# Patient Record
Sex: FEMALE | Race: WHITE | NOT HISPANIC OR LATINO | Employment: OTHER | URBAN - METROPOLITAN AREA
[De-identification: names, ages, dates, MRNs, and addresses within clinical notes are randomized per-mention and may not be internally consistent; named-entity substitution may affect disease eponyms.]

---

## 2023-03-08 LAB
ALANINE AMINOTRANSFERASE (SGPT) (U/L) IN SER/PLAS: 21 U/L (ref 7–45)
ALBUMIN (G/DL) IN SER/PLAS: 3.8 G/DL (ref 3.4–5)
ALKALINE PHOSPHATASE (U/L) IN SER/PLAS: 66 U/L (ref 33–136)
ANION GAP IN SER/PLAS: 14 MMOL/L (ref 10–20)
ASPARTATE AMINOTRANSFERASE (SGOT) (U/L) IN SER/PLAS: 27 U/L (ref 9–39)
BASOPHILS (10*3/UL) IN BLOOD BY AUTOMATED COUNT: 0.06 X10E9/L (ref 0–0.1)
BASOPHILS/100 LEUKOCYTES IN BLOOD BY AUTOMATED COUNT: 0.4 % (ref 0–2)
BILIRUBIN TOTAL (MG/DL) IN SER/PLAS: 0.3 MG/DL (ref 0–1.2)
CALCIUM (MG/DL) IN SER/PLAS: 8.9 MG/DL (ref 8.6–10.6)
CARBON DIOXIDE, TOTAL (MMOL/L) IN SER/PLAS: 27 MMOL/L (ref 21–32)
CHLORIDE (MMOL/L) IN SER/PLAS: 98 MMOL/L (ref 98–107)
CREATININE (MG/DL) IN SER/PLAS: 0.42 MG/DL (ref 0.5–1.05)
EOSINOPHILS (10*3/UL) IN BLOOD BY AUTOMATED COUNT: 0.18 X10E9/L (ref 0–0.7)
EOSINOPHILS/100 LEUKOCYTES IN BLOOD BY AUTOMATED COUNT: 1.3 % (ref 0–6)
ERYTHROCYTE DISTRIBUTION WIDTH (RATIO) BY AUTOMATED COUNT: 18.3 % (ref 11.5–14.5)
ERYTHROCYTE MEAN CORPUSCULAR HEMOGLOBIN CONCENTRATION (G/DL) BY AUTOMATED: 30.7 G/DL (ref 32–36)
ERYTHROCYTE MEAN CORPUSCULAR VOLUME (FL) BY AUTOMATED COUNT: 93 FL (ref 80–100)
ERYTHROCYTES (10*6/UL) IN BLOOD BY AUTOMATED COUNT: 4.46 X10E12/L (ref 4–5.2)
GFR FEMALE: >90 ML/MIN/1.73M2
GLUCOSE (MG/DL) IN SER/PLAS: 228 MG/DL (ref 74–99)
HEMATOCRIT (%) IN BLOOD BY AUTOMATED COUNT: 41.4 % (ref 36–46)
HEMOGLOBIN (G/DL) IN BLOOD: 12.7 G/DL (ref 12–16)
IMMATURE GRANULOCYTES/100 LEUKOCYTES IN BLOOD BY AUTOMATED COUNT: 0.4 % (ref 0–0.9)
LEUKOCYTES (10*3/UL) IN BLOOD BY AUTOMATED COUNT: 14.1 X10E9/L (ref 4.4–11.3)
LYMPHOCYTES (10*3/UL) IN BLOOD BY AUTOMATED COUNT: 5.71 X10E9/L (ref 1.2–4.8)
LYMPHOCYTES/100 LEUKOCYTES IN BLOOD BY AUTOMATED COUNT: 40.5 % (ref 13–44)
MONOCYTES (10*3/UL) IN BLOOD BY AUTOMATED COUNT: 1.47 X10E9/L (ref 0.1–1)
MONOCYTES/100 LEUKOCYTES IN BLOOD BY AUTOMATED COUNT: 10.4 % (ref 2–10)
NEUTROPHILS (10*3/UL) IN BLOOD BY AUTOMATED COUNT: 6.62 X10E9/L (ref 1.2–7.7)
NEUTROPHILS/100 LEUKOCYTES IN BLOOD BY AUTOMATED COUNT: 47 % (ref 40–80)
PLATELETS (10*3/UL) IN BLOOD AUTOMATED COUNT: 384 X10E9/L (ref 150–450)
POLYCHROMASIA IN BLOOD BY LIGHT MICROSCOPY: NORMAL
POTASSIUM (MMOL/L) IN SER/PLAS: 4.1 MMOL/L (ref 3.5–5.3)
PROTEIN TOTAL: 6.8 G/DL (ref 6.4–8.2)
RBC MORPHOLOGY IN BLOOD: NORMAL
SODIUM (MMOL/L) IN SER/PLAS: 135 MMOL/L (ref 136–145)
TARGET CELLS IN BLOOD BY LIGHT MICROSCOPY: NORMAL
UREA NITROGEN (MG/DL) IN SER/PLAS: 7 MG/DL (ref 6–23)

## 2023-07-14 ENCOUNTER — APPOINTMENT (OUTPATIENT)
Dept: LAB | Facility: LAB | Age: 63
End: 2023-07-14
Payer: COMMERCIAL

## 2023-07-14 LAB
APPEARANCE, URINE: CLEAR
BACTERIA, URINE: ABNORMAL /HPF
BILIRUBIN, URINE: NEGATIVE
BLOOD, URINE: NEGATIVE
BUDDING YEAST, URINE: PRESENT /HPF
COLOR, URINE: ABNORMAL
GLUCOSE, URINE: 1000 MG/DL
KETONES, URINE: NEGATIVE MG/DL
LEUKOCYTE ESTERASE, URINE: ABNORMAL
NITRITE, URINE: NEGATIVE
PH, URINE: 7.5 (ref 5–8)
PROTEIN, URINE: NEGATIVE MG/DL
RBC, URINE: ABNORMAL /HPF (ref 0–5)
SPECIFIC GRAVITY, URINE: 1 (ref 1–1.03)
SQUAMOUS EPITHELIAL CELLS, URINE: ABNORMAL /HPF
UROBILINOGEN, URINE: <2 MG/DL (ref 0.2–1)
WBC, URINE: ABNORMAL /HPF (ref 0–5)

## 2023-07-16 LAB — URINE CULTURE: NORMAL

## 2023-09-25 PROBLEM — Z79.84 LONG TERM (CURRENT) USE OF ORAL HYPOGLYCEMIC DRUGS: Status: ACTIVE | Noted: 2021-11-08

## 2023-09-25 PROBLEM — G62.9 PERIPHERAL NEUROPATHY: Status: ACTIVE | Noted: 2023-09-25

## 2023-09-25 PROBLEM — R50.9 FEVER: Status: ACTIVE | Noted: 2023-09-25

## 2023-09-25 PROBLEM — A41.9 SEPSIS (MULTI): Status: ACTIVE | Noted: 2023-09-25

## 2023-09-25 PROBLEM — E11.9 TYPE 2 DIABETES MELLITUS WITHOUT COMPLICATION (MULTI): Status: ACTIVE | Noted: 2023-05-26

## 2023-09-25 PROBLEM — E86.0 DEHYDRATION: Status: ACTIVE | Noted: 2023-05-26

## 2023-09-25 PROBLEM — Z15.09 GENETIC PREDISPOSITION TO CANCER: Status: ACTIVE | Noted: 2023-09-25

## 2023-09-25 PROBLEM — A41.9 SEPTIC SHOCK (MULTI): Status: ACTIVE | Noted: 2021-12-01

## 2023-09-25 PROBLEM — K12.30 MUCOSITIS: Status: ACTIVE | Noted: 2023-09-25

## 2023-09-25 PROBLEM — C78.7 PANCREATIC CANCER METASTASIZED TO LIVER (MULTI): Status: ACTIVE | Noted: 2023-09-25

## 2023-09-25 PROBLEM — J98.11 ATELECTASIS OF BOTH LUNGS: Status: ACTIVE | Noted: 2023-09-25

## 2023-09-25 PROBLEM — G47.00 INSOMNIA: Status: ACTIVE | Noted: 2023-09-25

## 2023-09-25 PROBLEM — Z87.891 PERSONAL HISTORY OF NICOTINE DEPENDENCE: Status: ACTIVE | Noted: 2021-11-08

## 2023-09-25 PROBLEM — D64.9 ANEMIA: Status: ACTIVE | Noted: 2023-09-25

## 2023-09-25 PROBLEM — I10 MALIGNANT ESSENTIAL HYPERTENSION: Status: ACTIVE | Noted: 2023-09-25

## 2023-09-25 PROBLEM — K56.609 SMALL BOWEL OBSTRUCTION (MULTI): Status: ACTIVE | Noted: 2023-05-26

## 2023-09-25 PROBLEM — K86.89 PANCREATIC FISTULA (HHS-HCC): Status: ACTIVE | Noted: 2023-09-25

## 2023-09-25 PROBLEM — C44.92 SCC (SQUAMOUS CELL CARCINOMA): Status: ACTIVE | Noted: 2023-09-25

## 2023-09-25 PROBLEM — Z90.411 HISTORY OF PARTIAL PANCREATECTOMY: Status: ACTIVE | Noted: 2023-09-25

## 2023-09-25 PROBLEM — R65.21 SEPTIC SHOCK (MULTI): Status: ACTIVE | Noted: 2021-12-01

## 2023-09-25 PROBLEM — Z90.81 POST-SPLENECTOMY: Status: ACTIVE | Noted: 2023-09-25

## 2023-09-25 PROBLEM — Z15.01 BRCA POSITIVE: Status: ACTIVE | Noted: 2023-09-25

## 2023-09-25 PROBLEM — E83.42 HYPOMAGNESEMIA: Status: ACTIVE | Noted: 2023-09-25

## 2023-09-25 PROBLEM — C25.9 PANCREATIC CANCER METASTASIZED TO LIVER (MULTI): Status: ACTIVE | Noted: 2023-09-25

## 2023-09-25 PROBLEM — Z99.81 REQUIRES OXYGEN THERAPY: Status: ACTIVE | Noted: 2023-09-25

## 2023-09-25 PROBLEM — R09.02 HYPOXEMIA: Status: ACTIVE | Noted: 2018-08-31

## 2023-09-25 PROBLEM — G47.34 NOCTURNAL HYPOXEMIA: Status: ACTIVE | Noted: 2023-09-25

## 2023-09-25 PROBLEM — J44.9 COPD (CHRONIC OBSTRUCTIVE PULMONARY DISEASE) (MULTI): Status: ACTIVE | Noted: 2023-09-25

## 2023-09-25 PROBLEM — R13.10 DYSPHAGIA: Status: ACTIVE | Noted: 2023-09-25

## 2023-09-25 PROBLEM — Z79.4 LONG TERM (CURRENT) USE OF INSULIN (MULTI): Status: ACTIVE | Noted: 2021-11-08

## 2023-09-25 PROBLEM — E87.8 HYPOCHLOREMIA: Status: ACTIVE | Noted: 2023-05-26

## 2023-09-25 PROBLEM — E87.6 HYPOKALEMIA: Status: ACTIVE | Noted: 2021-11-24

## 2023-09-25 PROBLEM — T82.9XXA COMPLICATION OF CENTRAL VENOUS CATHETER: Status: ACTIVE | Noted: 2023-09-25

## 2023-09-25 PROBLEM — E11.9 DIABETES MELLITUS WITHOUT COMPLICATION (MULTI): Status: ACTIVE | Noted: 2021-11-08

## 2023-09-25 PROBLEM — Z79.899 OTHER LONG TERM (CURRENT) DRUG THERAPY: Status: ACTIVE | Noted: 2021-11-08

## 2023-09-25 PROBLEM — E78.00 HYPERCHOLESTEROLEMIA: Status: ACTIVE | Noted: 2023-09-25

## 2023-09-25 PROBLEM — R09.02 HYPOXIA: Status: ACTIVE | Noted: 2023-09-25

## 2023-09-25 PROBLEM — I10 BENIGN ESSENTIAL HTN: Status: ACTIVE | Noted: 2023-05-26

## 2023-09-25 PROBLEM — C25.9: Status: ACTIVE | Noted: 2023-05-26

## 2023-09-25 PROBLEM — D22.39 MELANOCYTIC NEVI OF OTHER PARTS OF FACE: Status: ACTIVE | Noted: 2019-07-09

## 2023-09-25 PROBLEM — G89.3 CANCER-RELATED PAIN: Status: ACTIVE | Noted: 2023-09-25

## 2023-09-25 PROBLEM — C25.9 MALIGNANT NEOPLASM OF PANCREAS (MULTI): Status: ACTIVE | Noted: 2023-09-25

## 2023-09-25 PROBLEM — I10 HYPERTENSION: Status: ACTIVE | Noted: 2023-09-25

## 2023-09-25 PROBLEM — E11.42 DIABETIC POLYNEUROPATHY ASSOCIATED WITH TYPE 2 DIABETES MELLITUS (MULTI): Status: ACTIVE | Noted: 2023-06-12

## 2023-09-25 PROBLEM — G25.3 MYOCLONIC JERKING: Status: ACTIVE | Noted: 2021-12-01

## 2023-09-25 PROBLEM — E87.70 FLUID OVERLOAD: Status: ACTIVE | Noted: 2023-09-25

## 2023-09-25 PROBLEM — R31.0 GROSS HEMATURIA: Status: ACTIVE | Noted: 2023-05-28

## 2023-09-25 PROBLEM — L82.1 OTHER SEBORRHEIC KERATOSIS: Status: ACTIVE | Noted: 2019-07-09

## 2023-09-25 PROBLEM — R69 FAMILIAL DISEASE: Status: ACTIVE | Noted: 2023-09-25

## 2023-09-25 PROBLEM — E43 SEVERE PROTEIN-CALORIE MALNUTRITION (GOMEZ: LESS THAN 60% OF STANDARD WEIGHT) (MULTI): Status: ACTIVE | Noted: 2023-09-25

## 2023-09-25 PROBLEM — C25.2 MALIGNANT NEOPLASM OF TAIL OF PANCREAS (MULTI): Status: ACTIVE | Noted: 2021-11-08

## 2023-09-25 PROBLEM — R73.9 HYPERGLYCEMIA: Status: ACTIVE | Noted: 2023-09-25

## 2023-09-25 PROBLEM — I81 PORTAL VEIN THROMBOSIS: Status: ACTIVE | Noted: 2023-09-25

## 2023-09-25 PROBLEM — R97.8 ELEVATED CA 19-9 LEVEL: Status: ACTIVE | Noted: 2023-09-25

## 2023-09-25 PROBLEM — Z15.09 BRCA POSITIVE: Status: ACTIVE | Noted: 2023-09-25

## 2023-09-25 PROBLEM — K43.2 VENTRAL INCISIONAL HERNIA: Status: ACTIVE | Noted: 2023-05-26

## 2023-09-25 PROBLEM — E87.1 HYPONATREMIA: Status: ACTIVE | Noted: 2023-05-26

## 2023-09-25 PROBLEM — J96.11 CHRONIC RESPIRATORY FAILURE WITH HYPOXIA, ON HOME OXYGEN THERAPY (MULTI): Status: ACTIVE | Noted: 2023-05-26

## 2023-09-25 PROBLEM — F41.9 ANXIETY: Status: ACTIVE | Noted: 2023-09-25

## 2023-09-25 PROBLEM — R93.89 ABNORMAL CHEST CT: Status: ACTIVE | Noted: 2018-08-31

## 2023-09-25 PROBLEM — E66.9 OBESITY, CLASS I, BMI 30-34.9: Status: ACTIVE | Noted: 2023-05-27

## 2023-09-25 PROBLEM — Z99.81 CHRONIC RESPIRATORY FAILURE WITH HYPOXIA, ON HOME OXYGEN THERAPY (MULTI): Status: ACTIVE | Noted: 2023-05-26

## 2023-09-25 PROBLEM — R19.00 PELVIC MASS: Status: ACTIVE | Noted: 2023-09-25

## 2023-09-25 PROBLEM — R74.01 TRANSAMINITIS: Status: ACTIVE | Noted: 2023-09-25

## 2023-09-25 PROBLEM — J96.01 ACUTE RESPIRATORY FAILURE WITH HYPOXEMIA (MULTI): Status: ACTIVE | Noted: 2021-11-25

## 2023-09-25 PROBLEM — E44.1 MILD PROTEIN-CALORIE MALNUTRITION (MULTI): Status: ACTIVE | Noted: 2023-05-26

## 2023-09-25 PROBLEM — I26.99 PULMONARY EMBOLISM (MULTI): Status: ACTIVE | Noted: 2023-04-26

## 2023-09-25 PROBLEM — G47.33 OBSTRUCTIVE SLEEP APNEA, ADULT: Status: ACTIVE | Noted: 2023-09-25

## 2023-09-25 PROBLEM — G93.40 ACUTE ENCEPHALOPATHY: Status: ACTIVE | Noted: 2021-12-16

## 2023-09-25 PROBLEM — N17.9 ACUTE KIDNEY FAILURE, UNSPECIFIED (CMS-HCC): Status: ACTIVE | Noted: 2021-11-25

## 2023-09-25 PROBLEM — D18.01 HEMANGIOMA OF SKIN AND SUBCUTANEOUS TISSUE: Status: ACTIVE | Noted: 2019-07-09

## 2023-09-25 PROBLEM — F17.200 NICOTINE DEPENDENCE: Status: ACTIVE | Noted: 2023-05-27

## 2023-09-25 PROBLEM — K86.89 PANCREATIC MASS (HHS-HCC): Status: ACTIVE | Noted: 2023-09-25

## 2023-09-25 PROBLEM — D72.829 LEUKOCYTOSIS: Status: ACTIVE | Noted: 2023-05-26

## 2023-09-25 PROBLEM — R06.09 DYSPNEA ON EXERTION: Status: ACTIVE | Noted: 2018-08-31

## 2023-09-25 PROBLEM — D22.5 MELANOCYTIC NEVI OF TRUNK: Status: ACTIVE | Noted: 2019-07-09

## 2023-09-25 PROBLEM — C25.9: Status: ACTIVE | Noted: 2023-09-25

## 2023-09-25 PROBLEM — E66.9 OBESITY: Status: ACTIVE | Noted: 2023-09-25

## 2023-09-25 PROBLEM — Z90.411: Status: ACTIVE | Noted: 2021-11-08

## 2023-09-25 PROBLEM — I82.890 SPLENIC VEIN THROMBOSIS: Status: ACTIVE | Noted: 2023-09-25

## 2023-09-25 PROBLEM — C77.2: Status: ACTIVE | Noted: 2023-05-26

## 2023-09-25 PROBLEM — L81.4 OTHER MELANIN HYPERPIGMENTATION: Status: ACTIVE | Noted: 2019-07-09

## 2023-09-25 PROBLEM — N17.9 ACUTE KIDNEY INJURY (CMS-HCC): Status: ACTIVE | Noted: 2023-09-25

## 2023-09-25 PROBLEM — R41.82 ALTERED MENTAL STATUS: Status: ACTIVE | Noted: 2021-12-01

## 2023-09-25 RX ORDER — LOVASTATIN 40 MG/1
1 TABLET ORAL
COMMUNITY

## 2023-09-25 RX ORDER — FENTANYL 100 UG/H
1 PATCH TRANSDERMAL
COMMUNITY
Start: 2023-08-31 | End: 2023-10-05 | Stop reason: SDUPTHER

## 2023-09-25 RX ORDER — OXYCODONE HYDROCHLORIDE 10 MG/1
0.5 TABLET ORAL EVERY 4 HOURS PRN
COMMUNITY
Start: 2023-05-29 | End: 2023-11-16 | Stop reason: DRUGHIGH

## 2023-09-25 RX ORDER — EMOLLIENT - LOTION
LOTION TOPICAL
COMMUNITY
Start: 2020-07-07

## 2023-09-25 RX ORDER — LANOLIN ALCOHOL/MO/W.PET/CERES
1 CREAM (GRAM) TOPICAL DAILY
COMMUNITY
Start: 2023-05-29

## 2023-09-25 RX ORDER — INSULIN GLARGINE 300 U/ML
72 INJECTION, SOLUTION SUBCUTANEOUS DAILY
COMMUNITY

## 2023-09-25 RX ORDER — PREGABALIN 200 MG/1
1 CAPSULE ORAL 3 TIMES DAILY
COMMUNITY
End: 2023-11-16 | Stop reason: DRUGHIGH

## 2023-09-25 RX ORDER — AZITHROMYCIN 250 MG/1
TABLET, FILM COATED ORAL
COMMUNITY
Start: 2022-11-11

## 2023-09-25 RX ORDER — FENTANYL 50 UG/1
PATCH TRANSDERMAL
COMMUNITY
Start: 2023-07-24 | End: 2023-11-16 | Stop reason: DRUGHIGH

## 2023-09-25 RX ORDER — PANTOPRAZOLE SODIUM 40 MG/1
1 TABLET, DELAYED RELEASE ORAL EVERY MORNING
COMMUNITY

## 2023-09-25 RX ORDER — DULOXETIN HYDROCHLORIDE 30 MG/1
1 CAPSULE, DELAYED RELEASE ORAL EVERY MORNING
COMMUNITY
Start: 2023-08-30 | End: 2023-11-07 | Stop reason: SDUPTHER

## 2023-09-25 RX ORDER — CYCLOBENZAPRINE HCL 10 MG
1 TABLET ORAL 3 TIMES DAILY PRN
COMMUNITY
Start: 2017-09-14

## 2023-09-25 RX ORDER — POTASSIUM CHLORIDE 750 MG/1
2 TABLET, EXTENDED RELEASE ORAL DAILY
COMMUNITY
Start: 2023-09-06

## 2023-09-25 RX ORDER — DULAGLUTIDE 1.5 MG/.5ML
INJECTION, SOLUTION SUBCUTANEOUS
COMMUNITY
Start: 2019-09-10

## 2023-09-25 RX ORDER — ONDANSETRON HYDROCHLORIDE 8 MG/1
1 TABLET, FILM COATED ORAL 3 TIMES DAILY PRN
COMMUNITY
Start: 2019-01-15 | End: 2023-12-01 | Stop reason: SDUPTHER

## 2023-09-25 RX ORDER — PANCRELIPASE 24000; 76000; 120000 [USP'U]/1; [USP'U]/1; [USP'U]/1
2 CAPSULE, DELAYED RELEASE PELLETS ORAL 3 TIMES DAILY PRN
COMMUNITY
Start: 2020-02-18

## 2023-09-25 RX ORDER — FENTANYL 75 UG/H
PATCH TRANSDERMAL
COMMUNITY
Start: 2023-04-07 | End: 2023-11-16 | Stop reason: DRUGHIGH

## 2023-09-25 RX ORDER — INSULIN ASPART 100 [IU]/ML
8 INJECTION, SOLUTION INTRAVENOUS; SUBCUTANEOUS
COMMUNITY
Start: 2018-09-21

## 2023-09-25 RX ORDER — FLASH GLUCOSE SCANNING READER
EACH MISCELLANEOUS
COMMUNITY

## 2023-09-25 RX ORDER — TRAMADOL HYDROCHLORIDE 50 MG/1
1 TABLET ORAL EVERY 6 HOURS PRN
COMMUNITY
Start: 2019-01-02

## 2023-09-25 RX ORDER — FUROSEMIDE 20 MG/1
1 TABLET ORAL EVERY MORNING
COMMUNITY
Start: 2023-09-18 | End: 2023-11-03 | Stop reason: SDUPTHER

## 2023-09-25 RX ORDER — OLANZAPINE 10 MG/1
1 TABLET ORAL NIGHTLY
COMMUNITY
Start: 2022-11-10 | End: 2023-12-15 | Stop reason: SDUPTHER

## 2023-09-25 RX ORDER — PEN NEEDLE, DIABETIC 29 G X1/2"
NEEDLE, DISPOSABLE MISCELLANEOUS AS NEEDED
COMMUNITY

## 2023-09-25 RX ORDER — APIXABAN 5 MG (74)
KIT ORAL
COMMUNITY
Start: 2023-04-27

## 2023-09-25 RX ORDER — CARVEDILOL 3.12 MG/1
1 TABLET ORAL
COMMUNITY

## 2023-09-25 RX ORDER — OXYCODONE HYDROCHLORIDE 20 MG/1
1 TABLET ORAL EVERY 4 HOURS PRN
COMMUNITY
Start: 2023-08-31 | End: 2023-10-05 | Stop reason: SDUPTHER

## 2023-09-25 RX ORDER — MULTIVIT-MIN/FA/LYCOPEN/LUTEIN .4-300-25
1 TABLET ORAL DAILY
COMMUNITY
Start: 2019-10-17

## 2023-09-25 RX ORDER — DOXEPIN HYDROCHLORIDE 10 MG/ML
SOLUTION ORAL
COMMUNITY
Start: 2023-06-08

## 2023-09-25 RX ORDER — DOCUSATE SODIUM 100 MG/1
1 CAPSULE, LIQUID FILLED ORAL 2 TIMES DAILY PRN
COMMUNITY
Start: 2019-10-17

## 2023-09-25 RX ORDER — ZOLPIDEM TARTRATE 10 MG/1
1 TABLET ORAL NIGHTLY
COMMUNITY
End: 2023-11-01 | Stop reason: SDUPTHER

## 2023-09-25 RX ORDER — FLASH GLUCOSE SENSOR
KIT MISCELLANEOUS
COMMUNITY
Start: 2023-09-13

## 2023-09-25 RX ORDER — AMLODIPINE BESYLATE 10 MG/1
1 TABLET ORAL DAILY
COMMUNITY

## 2023-09-25 RX ORDER — LIDOCAINE AND PRILOCAINE 25; 25 MG/G; MG/G
CREAM TOPICAL
COMMUNITY
Start: 2018-10-24

## 2023-09-25 RX ORDER — PREGABALIN 75 MG/1
1 CAPSULE ORAL 3 TIMES DAILY
COMMUNITY
Start: 2023-01-31 | End: 2023-11-16 | Stop reason: DRUGHIGH

## 2023-09-25 RX ORDER — FLUOXETINE 20 MG/1
1 TABLET ORAL EVERY MORNING
COMMUNITY
Start: 2023-09-01

## 2023-09-25 RX ORDER — DAPAGLIFLOZIN AND METFORMIN HYDROCHLORIDE 5; 1000 MG/1; MG/1
1 TABLET, FILM COATED, EXTENDED RELEASE ORAL 2 TIMES DAILY
COMMUNITY

## 2023-09-25 RX ORDER — TRAZODONE HYDROCHLORIDE 50 MG/1
2 TABLET ORAL NIGHTLY
COMMUNITY
End: 2023-11-16 | Stop reason: WASHOUT

## 2023-09-25 RX ORDER — FENTANYL 12.5 UG/1
1 PATCH TRANSDERMAL
COMMUNITY
End: 2023-11-16 | Stop reason: DRUGHIGH

## 2023-09-25 RX ORDER — ISOPROPYL ALCOHOL 70 ML/100ML
SWAB TOPICAL
COMMUNITY
Start: 2023-08-03

## 2023-09-25 RX ORDER — PEN NEEDLE, DIABETIC 32GX 5/32"
NEEDLE, DISPOSABLE MISCELLANEOUS
COMMUNITY
Start: 2023-09-01

## 2023-09-25 RX ORDER — LISINOPRIL 30 MG/1
TABLET ORAL
COMMUNITY
Start: 2017-10-05

## 2023-09-25 RX ORDER — SULFAMETHOXAZOLE AND TRIMETHOPRIM 800; 160 MG/1; MG/1
1 TABLET ORAL 2 TIMES DAILY
COMMUNITY
Start: 2023-06-16

## 2023-09-25 RX ORDER — DULOXETIN HYDROCHLORIDE 60 MG/1
1 CAPSULE, DELAYED RELEASE ORAL 3 TIMES DAILY PRN
COMMUNITY
Start: 2023-07-06 | End: 2023-11-07 | Stop reason: SDUPTHER

## 2023-09-25 RX ORDER — APIXABAN 5 MG/1
1 TABLET, FILM COATED ORAL 2 TIMES DAILY
COMMUNITY

## 2023-09-25 RX ORDER — PROCHLORPERAZINE MALEATE 10 MG
1 TABLET ORAL EVERY 6 HOURS PRN
COMMUNITY

## 2023-09-25 RX ORDER — BLOOD SUGAR DIAGNOSTIC
STRIP MISCELLANEOUS
COMMUNITY
Start: 2017-05-10

## 2023-09-25 RX ORDER — SODIUM,POTASSIUM PHOSPHATES 280-250MG
2 POWDER IN PACKET (EA) ORAL 2 TIMES DAILY
COMMUNITY
Start: 2023-05-29

## 2023-09-25 RX ORDER — ONDANSETRON 8 MG/1
1 TABLET, ORALLY DISINTEGRATING ORAL EVERY 8 HOURS
COMMUNITY
Start: 2023-03-22 | End: 2023-11-29 | Stop reason: SDUPTHER

## 2023-09-25 RX ORDER — PANTOPRAZOLE SODIUM 40 MG/10ML
INJECTION, POWDER, LYOPHILIZED, FOR SOLUTION INTRAVENOUS
COMMUNITY

## 2023-09-25 RX ORDER — ALPRAZOLAM 1 MG/1
1 TABLET ORAL 2 TIMES DAILY PRN
COMMUNITY
Start: 2023-09-18 | End: 2023-11-01 | Stop reason: SDUPTHER

## 2023-09-25 RX ORDER — IPRATROPIUM BROMIDE AND ALBUTEROL SULFATE 2.5; .5 MG/3ML; MG/3ML
3 SOLUTION RESPIRATORY (INHALATION) 3 TIMES DAILY PRN
COMMUNITY
Start: 2021-02-23

## 2023-09-25 RX ORDER — PANCRELIPASE 36000; 180000; 114000 [USP'U]/1; [USP'U]/1; [USP'U]/1
CAPSULE, DELAYED RELEASE PELLETS ORAL
COMMUNITY
Start: 2022-11-04

## 2023-09-25 RX ORDER — ACETAMINOPHEN 500 MG
1 TABLET ORAL DAILY
COMMUNITY
Start: 2019-10-17

## 2023-09-25 RX ORDER — PREGABALIN 50 MG/1
CAPSULE ORAL
COMMUNITY
Start: 2018-09-14 | End: 2023-11-16 | Stop reason: DRUGHIGH

## 2023-09-25 RX ORDER — INSULIN LISPRO 100 [IU]/ML
INJECTION, SOLUTION INTRAVENOUS; SUBCUTANEOUS
COMMUNITY

## 2023-09-25 RX ORDER — LANCETS 33 GAUGE
EACH MISCELLANEOUS
COMMUNITY

## 2023-10-02 DIAGNOSIS — C25.9 MALIGNANT NEOPLASM OF PANCREAS, UNSPECIFIED LOCATION OF MALIGNANCY (MULTI): Primary | ICD-10-CM

## 2023-10-02 RX ORDER — PROCHLORPERAZINE MALEATE 10 MG
10 TABLET ORAL EVERY 6 HOURS PRN
Status: CANCELLED | OUTPATIENT
Start: 2023-10-11

## 2023-10-02 RX ORDER — HEPARIN SODIUM,PORCINE/PF 10 UNIT/ML
50 SYRINGE (ML) INTRAVENOUS AS NEEDED
Status: CANCELLED | OUTPATIENT
Start: 2023-10-11

## 2023-10-02 RX ORDER — HEPARIN 100 UNIT/ML
500 SYRINGE INTRAVENOUS AS NEEDED
Status: CANCELLED | OUTPATIENT
Start: 2023-10-11

## 2023-10-02 RX ORDER — PALONOSETRON 0.05 MG/ML
0.25 INJECTION, SOLUTION INTRAVENOUS ONCE
Status: CANCELLED | OUTPATIENT
Start: 2023-10-11

## 2023-10-02 RX ORDER — ALBUTEROL SULFATE 0.83 MG/ML
3 SOLUTION RESPIRATORY (INHALATION) AS NEEDED
Status: CANCELLED | OUTPATIENT
Start: 2023-10-11

## 2023-10-02 RX ORDER — PROCHLORPERAZINE EDISYLATE 5 MG/ML
10 INJECTION INTRAMUSCULAR; INTRAVENOUS EVERY 6 HOURS PRN
Status: CANCELLED | OUTPATIENT
Start: 2023-10-11

## 2023-10-02 RX ORDER — EPINEPHRINE 0.3 MG/.3ML
0.3 INJECTION SUBCUTANEOUS EVERY 5 MIN PRN
Status: CANCELLED | OUTPATIENT
Start: 2023-10-11

## 2023-10-02 RX ORDER — DEXAMETHASONE SODIUM PHOSPHATE 100 MG/10ML
6 INJECTION INTRAMUSCULAR; INTRAVENOUS ONCE
Status: CANCELLED | OUTPATIENT
Start: 2023-10-11

## 2023-10-02 RX ORDER — FAMOTIDINE 10 MG/ML
20 INJECTION INTRAVENOUS ONCE AS NEEDED
Status: CANCELLED | OUTPATIENT
Start: 2023-10-11

## 2023-10-02 RX ORDER — DIPHENHYDRAMINE HYDROCHLORIDE 50 MG/ML
50 INJECTION INTRAMUSCULAR; INTRAVENOUS AS NEEDED
Status: CANCELLED | OUTPATIENT
Start: 2023-10-11

## 2023-10-05 ENCOUNTER — OFFICE VISIT (OUTPATIENT)
Dept: HEMATOLOGY/ONCOLOGY | Facility: HOSPITAL | Age: 63
End: 2023-10-05
Payer: COMMERCIAL

## 2023-10-05 VITALS
HEIGHT: 64 IN | OXYGEN SATURATION: 94 % | WEIGHT: 194.89 LBS | DIASTOLIC BLOOD PRESSURE: 67 MMHG | BODY MASS INDEX: 33.27 KG/M2 | SYSTOLIC BLOOD PRESSURE: 132 MMHG | HEART RATE: 88 BPM | TEMPERATURE: 96.8 F | RESPIRATION RATE: 20 BRPM

## 2023-10-05 DIAGNOSIS — C25.9 PANCREATIC CANCER METASTASIZED TO LIVER (MULTI): Primary | ICD-10-CM

## 2023-10-05 DIAGNOSIS — C78.7 PANCREATIC CANCER METASTASIZED TO LIVER (MULTI): Primary | ICD-10-CM

## 2023-10-05 PROCEDURE — 3078F DIAST BP <80 MM HG: CPT | Performed by: NURSE PRACTITIONER

## 2023-10-05 PROCEDURE — 4010F ACE/ARB THERAPY RXD/TAKEN: CPT | Performed by: NURSE PRACTITIONER

## 2023-10-05 PROCEDURE — 3046F HEMOGLOBIN A1C LEVEL >9.0%: CPT | Performed by: NURSE PRACTITIONER

## 2023-10-05 PROCEDURE — 99214 OFFICE O/P EST MOD 30 MIN: CPT | Performed by: NURSE PRACTITIONER

## 2023-10-05 PROCEDURE — 3075F SYST BP GE 130 - 139MM HG: CPT | Performed by: NURSE PRACTITIONER

## 2023-10-05 ASSESSMENT — ENCOUNTER SYMPTOMS
OCCASIONAL FEELINGS OF UNSTEADINESS: 0
DEPRESSION: 0
LOSS OF SENSATION IN FEET: 0

## 2023-10-05 ASSESSMENT — PAIN SCALES - GENERAL: PAINLEVEL: 0-NO PAIN

## 2023-10-05 NOTE — PROGRESS NOTES
Patient ID: Chayito England is a 63 y.o. female.  Patient Visit Information:   Visit Type: Follow Up Visit      Cancer History:   Treatment Synopsis:    [facility]- started care at [facility], with Dr. Main.  Transferred to Dr. Blum     HEMATOLOGY & ONCOLOGY PROBLEMS:  1. Stage I pancreatic cancer.         a.  S/p distal pancreatectomy by Dr. Phelps.          b.  Adjuvant chemotherapy with Folfirinox from 2018 to March 2019.          c.  Recurrence 9/2021 with metastatic disease           Treated with : FOLFIRINOX, C1 = 10/26/2021 - continued thru Jan 2023 when she had progression         Feb 2023 - started QOW gem / abraxane          ** Copied from [address] [place] [address] on 31-Aug-2023 09:32AM by lauren **     Test Name: WorkMeIn xT (XT.V4)  Laboratory Name: [facility]  Specimen Source: Pancreas  Collection Date: 27-Aug-2018  Cancer Type: Pancreatic anaplastic carcinoma  Report Id: [id number]     Molecular Findings:  * Gene: KRAS, Variant: Missense variant (exon 2) - GOF, Biologically Relevant, p.G12D (Allele Frequency - 12.8%)  * Gene: TP53, Variant: Splice region variant - LOF, Biologically Relevant, p.T125T, Splice Site (Allele Frequency - 8.8%)  * Gene: CKS1B, Variant: Amplification, Copy number gain, Biologically Relevant  * Biomarker: Microsatellite Instability, Status: stable  * Biomarker: Tumor Mutation Oxly (1.6 Muts/Mb, Percentile: 12.0)  * 3 variants of unknown significance     ** End of copied information **     CHIEF COMPLAINT:    The patient is in the clinic today for management of stage IV pancreatic cancer.               HISTORY:   Ms. England is a 61-year-old pleasant female with stage I pancreatic cancer. She initially presented to the ER with complaints of worsening abdomen pain and was noted to have a 1.6 cm pancreatic lesion. Staging evaluation revealed localized disease and she  underwent a distal pancreatectomy by Dr. Phelps. She was treated with adjuvant chemotherapy with  Folfirinox from  to 2019. Her family history is significant as her mother, father, grandmother, sister, and two aunts - all dieing from pancreatic  cancer. She and some other members have genetic testing done. They all of been noted to have  BRIP1 c.508-1G>C. As per genetist review, mutations in the BRIP1 gene are associated with a 10% lifetime risk of ovarian cancer and an elevated risk of breast  cancer (exact quantification of risk not available). She was given the option of prophylactic oophrectomies but she declined.     INTERVAL HISTORY:She had CT guided liver bx at [facility] revealing recurrent pancreas cancer.      PAST MEDICAL HISTORY:   1. Pancreatic cancer as detailed above.   2. History of distal pancreatectomy.  3. Diabetes mellitus.   4. Partial hysterectomy for uterine fibroid in .   5. Bilateral prophylactic Oophrectomy in      SOCIAL HISTORY:   Lives with her son in [place]. Former smoker with 1.5 ppd for 32 years prior smoking history. Nonalcoholic. She is on disability and previously used to work as a nursing assistant.  Born and raised in [place].      FAMILY HISTORY:    Mother  at age 72 from pancreatic cancer. Father  at age 66 from pancreatic cancer. Had 5 siblings, one sister  at age 34 from pancreatic cancer. Has 2 sons and one grandchild ~ all alive and well. Her grandmother  at age 75 from pancreatic  cancer. Her two aunts also  from pancreatic cancer. No other specific history of bleeding, clotting or malignant disorder in the family.     REVIEW OF SYSTEMS:  Pertinent finding as per the history above.  All other systems have been reviewed and generally negative and noncontributory.           History of Present Illness:      ID Statement:    FERNANDA THOMPSON is a 63 year old Female        Chief Complaint: chemotherapy follow up   Interval History:       Oncologic history:     Stage IV pancreatic cancer s/p distal pancreatectomy by Dr. Phelps.   Adjuvant  "chemo with FOLFIRINOX from 2018 to March 2019  Disease progression 9/2021 with mets   Continued FOLFIRINOX through Jan 2023 Feb 2023 started gem/abraxane   Aug 2023 showed PD with increased size of the liver lesion and some new pulm nodules  - started tx with gem/cis            Review of Systems:   Review of Systems:    ROS as listed per interval history - all other systems reviewed & are negative.      Subjective    HPI  -s/p 1 dose of gem/cis   Next infusion 10/11    - low grade fever - not above 100   - using oxygen mostly at home   -very fatigued during last infusion  ?fentanyl related   Has been OK since last visit   Had 1 episode of severe pain which resolved     - getting out a few times per week   Appetite is good    Lives alone - has life alert / no steps in house     Current Outpatient Medications on File Prior to Visit   Medication Sig Dispense Refill    ALPRAZolam (Xanax) 1 mg tablet Take 1 tablet (1 mg) by mouth 2 times a day as needed for anxiety.      amLODIPine (Norvasc) 10 mg tablet Take 1 tablet (10 mg) by mouth once daily.      BD Sonia 2nd Gen Pen Needle 32 gauge x 5/32\" needle USE AS DIRECTED TO INJECT INSULIN WITH PEN 4 TIMES A DAY      BIOTIN ORAL Take 2,500 mcg by mouth once daily.      blood sugar diagnostic (Contour Test Strips) strip Cheryl Contour Test STRP   Quantity: 150  Refills: 0        Start : 10-May-2017   Active      carvedilol (Coreg) 3.125 mg tablet Take 1 tablet (3.125 mg) by mouth 2 times a day with meals.      cholecalciferol (Vitamin D-3) 50 mcg (2,000 unit) capsule Take 1 capsule (50 mcg) by mouth early in the morning..      Creon 36,000-114,000- 180,000 unit capsule,delayed release(DR/EC) capsule TAKE TWO (2) CAPSULES BY MOUTH WITH EACH MEAL AND TAKE 1 CAPSULE WITH EVERY SNACK      cyclobenzaprine (Flexeril) 10 mg tablet Take 1 tablet (10 mg) by mouth 3 times a day as needed.      docusate sodium (Colace) 100 mg capsule Take 1 capsule (100 mg) by mouth 2 times a day as " needed for constipation.      doxepin (SINEquan) 10 mg/mL solution TAKE 2.5ml mixed WITH 5ml OF water and swish and spit up to THREE TIMES DAILY      DULoxetine (Cymbalta) 30 mg DR capsule Take 1 capsule (30 mg) by mouth once daily in the morning. For depression and anxiety      DULoxetine (Cymbalta) 60 mg DR capsule Take 1 capsule (60 mg) by mouth 3 times a day as needed.      Easy Touch Alcohol Prep Pads pads, medicated       Eliquis 5 mg tablet Take 1 tablet (5 mg) by mouth 2 times a day.      fentaNYL (Duragesic) 12 mcg/hr patch Place 1 patch on the skin every 3rd day.      fentaNYL (Duragesic) 50 mcg/hr patch apply 2 (TWO) patches transdermally EVERY 72 hours      fentaNYL (Duragesic) 75 mcg/hr patch Apply topically to outer arm every 72 hours, remove old patch before applying new one      FLUoxetine (PROzac) 20 mg tablet Take 1 tablet (20 mg) by mouth once daily in the morning.      FreeStyle Herminio 2 Sensor kit use as directed to monitor blood sugar      FreeStyle Herminio reader (FreeStyle Herminio 14 Day [place]) misc Inject under the skin. Use as instructed      furosemide (Lasix) 20 mg tablet Take 1 tablet (20 mg) by mouth once daily in the morning.      HumaLOG KwikPen Insulin 100 unit/mL injection INJECT 45 UNITS SUBCUTANEOUSLY EACH MORNING, 35 UNITS AT LUNCH AND 40 UNITS AT DINNER      insulin aspart (NovoLOG) 100 unit/mL injection Inject 8 Units under the skin 3 times a day after meals.      insulin detemir (Levemir) 100 unit/mL injection Inject 20 Units under the skin once daily.      ipratropium-albuteroL (Duo-Neb) 0.5-2.5 mg/3 mL nebulizer solution Inhale 3 mL 3 times a day as needed.      lancets (TRUEplus Lancets) 33 gauge misc Test 4 times daily      lisinopril 30 mg tablet Lisinopril 30 MG Oral Tablet   Quantity: 30  Refills: 0        Start : 5-Oct-2017   Active      lovastatin (Mevacor) 40 mg tablet Take 1 tablet (40 mg) by mouth once daily in the evening. Take with meals.      LYCOPENE ORAL Take by  "mouth once every 24 hours.      multivitamin with minerals iron-free (Centrum Silver) Take 1 tablet by mouth once daily.      OLANZapine (ZyPREXA) 10 mg tablet Take 1 tablet (10 mg) by mouth once daily at bedtime. For nausea      ondansetron (Zofran) 8 mg tablet Take 1 tablet (8 mg) by mouth 3 times a day as needed for nausea or vomiting.      ondansetron ODT (Zofran-ODT) 8 mg disintegrating tablet Take 1 tablet (8 mg) by mouth every 8 hours.      oxyCODONE (Roxicodone) 10 mg immediate release tablet Take 0.5 tablets (5 mg) by mouth every 4 hours if needed for severe pain (7 - 10).      pantoprazole (ProtoNix) 40 mg EC tablet Take 1 tablet (40 mg) by mouth once daily in the morning.      pen needle 1/2\" 29G X 12mm needle Inject under the skin if needed. Use as instructed      potassium chloride CR 10 mEq ER tablet Take 2 tablets (20 mEq) by mouth once daily.      pregabalin (Lyrica) 200 mg capsule Take 1 capsule (200 mg) by mouth 3 times a day. For pain      prochlorperazine (Compazine) 10 mg tablet Take 1 tablet (10 mg) by mouth every 6 hours if needed (for chemotherapy induced nausea).      Toujeo SoloStar U-300 Insulin 300 unit/mL (1.5 mL) injection Inject 72 Units under the skin once daily.      Trulicity 1.5 mg/0.5 mL pen injector injection Inject under the skin 1 (one) time per week.      Xigduo XR 5-1,000 mg Take 1 tablet by mouth 2 times a day.      zolpidem (Ambien) 10 mg tablet Take 1 tablet (10 mg) by mouth once daily at bedtime. For insomnia      [DISCONTINUED] fentaNYL (Duragesic) 100 mcg/hr patch Place 1 patch on the skin every 3rd day. Remove old patch before applying new one      [DISCONTINUED] oxyCODONE (Roxicodone) 20 mg immediate release tablet Take 1 tablet (20 mg) by mouth every 4 hours if needed (for breakthrough pain).      azithromycin (Zithromax) 250 mg tablet take as directed      Cetaphil (Cetaphil Moisturizing) moisturizing lotion       Eliquis DVT-PE Treat 30D Start 5 mg (74 tabs) " "tablets,dose pack       lidocaine HCL 1 % lotion Lidocaine [employer] Moisturizing External Lotion APPLY TWICE DAILY AS NEEDED Quantity: 473 Refills: 0 DO Start : 17-Oct-2019 Active 10-  [id number]      lidocaine-prilocaine (Emla) 2.5-2.5 % cream 1 adolfo, Topical, PRN, PRN PRN See Note Line, Apply 1 1/2 hour priot to access with Q-tip. Cover with plastic wrap., # 1 tubes, Refill(s) 0, Date: 10/24/2018 12:26:00 EDT, Pharmacy: [facility]/pharmacy #[id number], 1 adolfo Topical PRN,PRN:See Note Line,Instr:Apply 1 1...      lipase-protease-amylase (Creon) 24,000-76,000 -120,000 unit capsule Take 2 capsules by mouth 3 times a day as needed. Patient must be seen by physician before further details      magnesium oxide (Mag-Ox) 400 mg (241.3 mg magnesium) tablet Take 1 tablet (400 mg) by mouth once daily. Do not stop unless instructed by MD.      pantoprazole (ProtoNix) 40 mg injection Pantoprazole Sodium 40 MG Intravenous Solution Reconstituted   Refills: 0       Active      potassium, sodium phosphates (Phos-NaK) 280-160-250 mg packet Take 2 packets by mouth twice a day. Do not stop unless instructed by MD.      pregabalin (Lyrica) 50 mg capsule Lyrica 50 MG Oral Capsule   Refills: 0       Active      pregabalin (Lyrica) 75 mg capsule Take 1 capsule (75 mg) by mouth 3 times a day.      sulfamethoxazole-trimethoprim (Bactrim DS) 800-160 mg tablet Take 1 tablet by mouth 2 times a day.      traMADol (Ultram) 50 mg tablet Take 1 tablet (50 mg) by mouth every 6 hours if needed.      traZODone (Desyrel) 50 mg tablet Take 2 tablets (100 mg) by mouth once daily at bedtime.       No current facility-administered medications on file prior to visit.                Objective    BSA: 2 meters squared  /67 (BP Location: Left arm, Patient Position: Sitting)   Pulse 88   Temp 36 °C (96.8 °F) (Core)   Resp 20   Ht (S) 1.635 m (5' 4.37\")   Wt 88.4 kg (194 lb 14.2 oz)   SpO2 94%   BMI 33.07 kg/m²      Physical " Exam  HENT:      Head: Normocephalic and atraumatic.      Mouth/Throat:      Mouth: Mucous membranes are moist.   Eyes:      Conjunctiva/sclera: Conjunctivae normal.   Cardiovascular:      Rate and Rhythm: Normal rate and regular rhythm.   Pulmonary:      Breath sounds: Normal breath sounds.   Abdominal:      General: Bowel sounds are normal.      Palpations: Abdomen is soft.   Musculoskeletal:         General: Normal range of motion.      Cervical back: Normal range of motion.   Skin:     General: Skin is warm.   Neurological:      General: No focal deficit present.      Mental Status: She is alert and oriented to person, place, and time.   Psychiatric:         Mood and Affect: Mood normal.       [facility]acy Encounter on 09/13/2023   Component Date Value    WBC 09/13/2023 21.5 (H)     RBC 09/13/2023 4.52     Hemoglobin 09/13/2023 10.9 (L)     Hematocrit 09/13/2023 36.2     MCV 09/13/2023 80     MCHC 09/13/2023 30.1 (L)     Platelets 09/13/2023 479 (H)     RDW 09/13/2023 21.2 (H)     Neutrophils % 09/13/2023 53.2     Immature Granulocytes %,* 09/13/2023 0.4     Lymphocytes % 09/13/2023 29.5     Monocytes % 09/13/2023 16.1     Eosinophils % 09/13/2023 0.4     Basophils % 09/13/2023 0.4     Neutrophils Absolute 09/13/2023 11.46 (H)     Lymphocytes Absolute 09/13/2023 6.34 (H)     Monocytes Absolute 09/13/2023 3.47 (H)     Eosinophils Absolute 09/13/2023 0.08     Basophils Absolute 09/13/2023 0.08     CANCER AG 19-9 (U/ML) IN* 09/13/2023 4,797.78 (A)     Glucose 09/13/2023 301 (H)     Sodium 09/13/2023 133 (L)     Potassium 09/13/2023 4.2     Chloride 09/13/2023 93 (L)     Bicarbonate 09/13/2023 30     Anion Gap 09/13/2023 14     Urea Nitrogen 09/13/2023 11     Creatinine 09/13/2023 0.53     GFR Female 09/13/2023 >90     Calcium 09/13/2023 9.1     Albumin 09/13/2023 3.7     Alkaline Phosphatase 09/13/2023 89     Total Protein 09/13/2023 7.5     AST 09/13/2023 23     Total Bilirubin 09/13/2023 0.3     ALT (SGPT) 09/13/2023  16     RBC Morphology 09/13/2023 See Below     Target Cells 09/13/2023 Few     Quinn-Canova Bodies 09/13/2023 Present         Performance Status:  Symptomatic; in bed <50% of the day      Assessment/Plan Assessment and Plan:   Assessment:    63 y/o woman with Stage Ia pancreatic cancer s/p DPS with Dr. Phelps in 2018 followed by adjuvant FOLFIRINOX until 3/2019.  She has a significant family history  with her mother, father, grandmother, sister, and two aunts - all dying from pancreatic cancer. Family carries BRIP1 c.508-1G>C. As per 's review, mutations in the BRIP1 gene are associated with a 10% lifetime risk of ovarian cancer and an elevated  risk of breast cancer (exact quantification of risk not available). Her  level was found to be markedly elevated at 1614 in late 2021. Follow-up CT scan showed an isolated liver lesion and enlarged peripancreatic LN.  CT-guided biopsy of liver done  at [facility] was positive for recurrent adenocarcinoma.   She was removed from CD40+ Budigalimab + FOLFIRINOX 2/2 kidney injury -- received 2 cycles FOLFIRINOX resumed 01/2022 -- dose reductions for oxaliplatin, infusional 5FU, irinotecan, 5FU bolus omitted.  Oxaliplatin dropped in July 2022 due to worsening  neuropathy.  Has continued on Q 2 week FOLFIRI with decreasing  levels to 300's.  Had treatment break in september due to vacation.  Restarted on 10/5.  Tolerated well.  Treatment held x 1 dose in 12/2022 for mild COVID +, with complete recovery  and treatment resumed in 1/2023. She had prorgression in 2/2023 in liver and lungs.   - Newly diagnosed PE April 2023     # Resected Pancreatic cancer with subsequent liver metastasis: liver primary  disease-  - Switched  to gemcitabine/ nab-paclitaxel every 2 weeks at [facility] in March 2023  - 4/29/23 - s/p 2 cycles - CT scans wtih slight increase in 1 liver lesion, reviewed with Dr. Blum - overall stable dz   - 5/3/23 - C3D1    -  5/17/23 - C3D15 held due to fall    - head CT negative in ED    - 5/23 - 5/29/23 admitted to [facility] with SBO / resolved after NG decompression   Progression of disease noted on CT C/A/P with enlarging liver lesion. Plan to start [place]/cis on 09/13/23    - tolerated well,  next tx due 10/11  Plan for phone visit on 10/24 -   Scans again after 4 doses - follow  level   6Obtain [facility] for other targeted options.  - Look for BRIP1 trials.        #PE    - continue anticoagulation lifelong  - as needed oxygen      #mucositis    - continue baking soda rinses    - try magic mouthwash      # Diarrhea:    - improved on enzymes / lomotil.         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 Saunders in gyn/onc  - Last mammogram 3/23       Cancer Staging   No matching staging information was found for the patient.    Oncology History   Malignant neoplasm of pancreas (CMS/HCC)   9/14/2023 -  Chemotherapy    Gemcitabine / CISplatin, 28 Day Cycles - Pancreatic Adenocarcinoma     9/25/2023 Initial Diagnosis    Malignant neoplasm of pancreas (CMS/HCC)                   GIUSEPPE Espinoza-CNP

## 2023-10-10 ENCOUNTER — INFUSION (OUTPATIENT)
Dept: HEMATOLOGY/ONCOLOGY | Facility: CLINIC | Age: 63
End: 2023-10-10
Payer: COMMERCIAL

## 2023-10-10 VITALS
DIASTOLIC BLOOD PRESSURE: 86 MMHG | BODY MASS INDEX: 33.12 KG/M2 | WEIGHT: 195.22 LBS | HEART RATE: 104 BPM | TEMPERATURE: 97 F | RESPIRATION RATE: 18 BRPM | OXYGEN SATURATION: 95 % | SYSTOLIC BLOOD PRESSURE: 145 MMHG

## 2023-10-10 DIAGNOSIS — C25.9 MALIGNANT NEOPLASM OF PANCREAS, UNSPECIFIED LOCATION OF MALIGNANCY (MULTI): ICD-10-CM

## 2023-10-10 LAB
BASOPHILS # BLD AUTO: 0.07 X10*3/UL (ref 0–0.1)
BASOPHILS NFR BLD AUTO: 0.4 %
EOSINOPHIL # BLD AUTO: 0.07 X10*3/UL (ref 0–0.7)
EOSINOPHIL NFR BLD AUTO: 0.4 %
ERYTHROCYTE [DISTWIDTH] IN BLOOD BY AUTOMATED COUNT: 22.6 % (ref 11.5–14.5)
HCT VFR BLD AUTO: 37.2 % (ref 36–46)
HGB BLD-MCNC: 11 G/DL (ref 12–16)
HOWELL-JOLLY BOD BLD QL SMEAR: PRESENT
IMM GRANULOCYTES # BLD AUTO: 0.05 X10*3/UL (ref 0–0.7)
IMM GRANULOCYTES NFR BLD AUTO: 0.3 % (ref 0–0.9)
LYMPHOCYTES # BLD AUTO: 6.26 X10*3/UL (ref 1.2–4.8)
LYMPHOCYTES NFR BLD AUTO: 34.6 %
MCH RBC QN AUTO: 23.6 PG (ref 26–34)
MCHC RBC AUTO-ENTMCNC: 29.6 G/DL (ref 32–36)
MCV RBC AUTO: 80 FL (ref 80–100)
MONOCYTES # BLD AUTO: 1.65 X10*3/UL (ref 0.1–1)
MONOCYTES NFR BLD AUTO: 9.1 %
NEUTROPHILS # BLD AUTO: 9.99 X10*3/UL (ref 1.2–7.7)
NEUTROPHILS NFR BLD AUTO: 55.2 %
NRBC BLD-RTO: ABNORMAL /100{WBCS}
OVALOCYTES BLD QL SMEAR: NORMAL
PLATELET # BLD AUTO: 556 X10*3/UL (ref 150–450)
PMV BLD AUTO: 9.1 FL (ref 7.5–11.5)
RBC # BLD AUTO: 4.66 X10*6/UL (ref 4–5.2)
RBC MORPH BLD: NORMAL
TARGETS BLD QL SMEAR: NORMAL
WBC # BLD AUTO: 18.1 X10*3/UL (ref 4.4–11.3)

## 2023-10-10 PROCEDURE — 36591 DRAW BLOOD OFF VENOUS DEVICE: CPT

## 2023-10-10 PROCEDURE — 83735 ASSAY OF MAGNESIUM: CPT

## 2023-10-10 PROCEDURE — 80053 COMPREHEN METABOLIC PANEL: CPT

## 2023-10-10 PROCEDURE — 36415 COLL VENOUS BLD VENIPUNCTURE: CPT

## 2023-10-10 PROCEDURE — 85025 COMPLETE CBC W/AUTO DIFF WBC: CPT

## 2023-10-10 PROCEDURE — 96523 IRRIG DRUG DELIVERY DEVICE: CPT

## 2023-10-10 RX ORDER — HEPARIN SODIUM,PORCINE/PF 10 UNIT/ML
50 SYRINGE (ML) INTRAVENOUS AS NEEDED
Status: CANCELLED | OUTPATIENT
Start: 2023-10-10

## 2023-10-10 RX ORDER — HEPARIN 100 UNIT/ML
500 SYRINGE INTRAVENOUS AS NEEDED
Status: CANCELLED | OUTPATIENT
Start: 2023-10-10

## 2023-10-10 ASSESSMENT — PAIN SCALES - GENERAL: PAINLEVEL: 0-NO PAIN

## 2023-10-10 NOTE — SIGNIFICANT EVENT
Fall risk reviewed   10/10/23 1412   Kenneth Fall Risk   History of Falling, Immediate or Within 3 Months 0   Secondary Diagnosis 15

## 2023-10-10 NOTE — PROGRESS NOTES
Pt here for portflush labs denies pain or problem-    Cvad in good working order labs drawn cmp pending cbcd reviewed-    Plan is for tx tomorrow pt verbalized understanding of plan of care      b/h/ lung sounds not auscultated  patient verbalizes understanding of plan of care

## 2023-10-11 ENCOUNTER — APPOINTMENT (OUTPATIENT)
Dept: HEMATOLOGY/ONCOLOGY | Facility: CLINIC | Age: 63
End: 2023-10-11
Payer: COMMERCIAL

## 2023-10-11 ENCOUNTER — NUTRITION (OUTPATIENT)
Dept: HEMATOLOGY/ONCOLOGY | Facility: CLINIC | Age: 63
End: 2023-10-11
Payer: COMMERCIAL

## 2023-10-11 ENCOUNTER — INFUSION (OUTPATIENT)
Dept: HEMATOLOGY/ONCOLOGY | Facility: CLINIC | Age: 63
End: 2023-10-11
Payer: COMMERCIAL

## 2023-10-11 VITALS
RESPIRATION RATE: 16 BRPM | BODY MASS INDEX: 33.06 KG/M2 | WEIGHT: 193.67 LBS | DIASTOLIC BLOOD PRESSURE: 80 MMHG | OXYGEN SATURATION: 95 % | SYSTOLIC BLOOD PRESSURE: 130 MMHG | TEMPERATURE: 98.4 F | HEIGHT: 64 IN | HEART RATE: 95 BPM

## 2023-10-11 VITALS — WEIGHT: 193.67 LBS | BODY MASS INDEX: 32.86 KG/M2

## 2023-10-11 DIAGNOSIS — C25.9 MALIGNANT NEOPLASM OF PANCREAS, UNSPECIFIED LOCATION OF MALIGNANCY (MULTI): ICD-10-CM

## 2023-10-11 LAB
ALBUMIN SERPL BCP-MCNC: 3.3 G/DL (ref 3.4–5)
ALP SERPL-CCNC: 94 U/L (ref 33–136)
ALT SERPL W P-5'-P-CCNC: 14 U/L (ref 7–45)
ANION GAP SERPL CALC-SCNC: 18 MMOL/L (ref 10–20)
AST SERPL W P-5'-P-CCNC: 19 U/L (ref 9–39)
BILIRUB SERPL-MCNC: 0.3 MG/DL (ref 0–1.2)
BUN SERPL-MCNC: 12 MG/DL (ref 6–23)
CALCIUM SERPL-MCNC: 8.7 MG/DL (ref 8.6–10.6)
CHLORIDE SERPL-SCNC: 94 MMOL/L (ref 98–107)
CO2 SERPL-SCNC: 25 MMOL/L (ref 21–32)
CREAT SERPL-MCNC: 0.56 MG/DL (ref 0.5–1.05)
GFR SERPL CREATININE-BSD FRML MDRD: >90 ML/MIN/1.73M*2
GLUCOSE SERPL-MCNC: 639 MG/DL (ref 74–99)
MAGNESIUM SERPL-MCNC: 1.87 MG/DL (ref 1.6–2.4)
POTASSIUM SERPL-SCNC: 4.7 MMOL/L (ref 3.5–5.3)
PROT SERPL-MCNC: 7.4 G/DL (ref 6.4–8.2)
SODIUM SERPL-SCNC: 132 MMOL/L (ref 136–145)

## 2023-10-11 PROCEDURE — 96375 TX/PRO/DX INJ NEW DRUG ADDON: CPT

## 2023-10-11 PROCEDURE — 96523 IRRIG DRUG DELIVERY DEVICE: CPT

## 2023-10-11 PROCEDURE — 96417 CHEMO IV INFUS EACH ADDL SEQ: CPT

## 2023-10-11 PROCEDURE — 96367 TX/PROPH/DG ADDL SEQ IV INF: CPT

## 2023-10-11 PROCEDURE — 96413 CHEMO IV INFUSION 1 HR: CPT

## 2023-10-11 PROCEDURE — 2500000004 HC RX 250 GENERAL PHARMACY W/ HCPCS (ALT 636 FOR OP/ED): Mod: SE | Performed by: INTERNAL MEDICINE

## 2023-10-11 PROCEDURE — 96361 HYDRATE IV INFUSION ADD-ON: CPT

## 2023-10-11 RX ORDER — EPINEPHRINE 0.3 MG/.3ML
0.3 INJECTION SUBCUTANEOUS EVERY 5 MIN PRN
Status: DISCONTINUED | OUTPATIENT
Start: 2023-10-11 | End: 2023-10-11 | Stop reason: HOSPADM

## 2023-10-11 RX ORDER — ALBUTEROL SULFATE 0.83 MG/ML
3 SOLUTION RESPIRATORY (INHALATION) AS NEEDED
Status: DISCONTINUED | OUTPATIENT
Start: 2023-10-11 | End: 2023-10-11 | Stop reason: HOSPADM

## 2023-10-11 RX ORDER — HEPARIN 100 UNIT/ML
500 SYRINGE INTRAVENOUS AS NEEDED
Status: CANCELLED | OUTPATIENT
Start: 2023-10-11

## 2023-10-11 RX ORDER — DEXAMETHASONE SODIUM PHOSPHATE 4 MG/ML
6 INJECTION, SOLUTION INTRA-ARTICULAR; INTRALESIONAL; INTRAMUSCULAR; INTRAVENOUS; SOFT TISSUE ONCE
Status: COMPLETED | OUTPATIENT
Start: 2023-10-11 | End: 2023-10-11

## 2023-10-11 RX ORDER — PROCHLORPERAZINE EDISYLATE 5 MG/ML
10 INJECTION INTRAMUSCULAR; INTRAVENOUS EVERY 6 HOURS PRN
Status: DISCONTINUED | OUTPATIENT
Start: 2023-10-11 | End: 2023-10-11 | Stop reason: HOSPADM

## 2023-10-11 RX ORDER — HEPARIN 100 UNIT/ML
500 SYRINGE INTRAVENOUS AS NEEDED
Status: DISCONTINUED | OUTPATIENT
Start: 2023-10-11 | End: 2023-10-11 | Stop reason: HOSPADM

## 2023-10-11 RX ORDER — PROCHLORPERAZINE MALEATE 10 MG
10 TABLET ORAL EVERY 6 HOURS PRN
Status: DISCONTINUED | OUTPATIENT
Start: 2023-10-11 | End: 2023-10-11 | Stop reason: HOSPADM

## 2023-10-11 RX ORDER — HEPARIN SODIUM,PORCINE/PF 10 UNIT/ML
50 SYRINGE (ML) INTRAVENOUS AS NEEDED
Status: CANCELLED | OUTPATIENT
Start: 2023-10-11

## 2023-10-11 RX ORDER — HEPARIN SODIUM,PORCINE/PF 10 UNIT/ML
50 SYRINGE (ML) INTRAVENOUS AS NEEDED
Status: DISCONTINUED | OUTPATIENT
Start: 2023-10-11 | End: 2023-10-11 | Stop reason: HOSPADM

## 2023-10-11 RX ORDER — FAMOTIDINE 10 MG/ML
20 INJECTION INTRAVENOUS ONCE AS NEEDED
Status: DISCONTINUED | OUTPATIENT
Start: 2023-10-11 | End: 2023-10-11 | Stop reason: HOSPADM

## 2023-10-11 RX ORDER — DIPHENHYDRAMINE HYDROCHLORIDE 50 MG/ML
50 INJECTION INTRAMUSCULAR; INTRAVENOUS AS NEEDED
Status: DISCONTINUED | OUTPATIENT
Start: 2023-10-11 | End: 2023-10-11 | Stop reason: HOSPADM

## 2023-10-11 RX ORDER — PALONOSETRON 0.05 MG/ML
0.25 INJECTION, SOLUTION INTRAVENOUS ONCE
Status: COMPLETED | OUTPATIENT
Start: 2023-10-11 | End: 2023-10-11

## 2023-10-11 RX ADMIN — POTASSIUM CHLORIDE 1000 ML/HR: 2 INJECTION, SOLUTION, CONCENTRATE INTRAVENOUS at 09:49

## 2023-10-11 RX ADMIN — PALONOSETRON 250 MCG: 0.05 INJECTION, SOLUTION INTRAVENOUS at 10:53

## 2023-10-11 RX ADMIN — DEXAMETHASONE SODIUM PHOSPHATE 6 MG: 4 INJECTION, SOLUTION INTRA-ARTICULAR; INTRALESIONAL; INTRAMUSCULAR; INTRAVENOUS; SOFT TISSUE at 10:53

## 2023-10-11 RX ADMIN — CISPLATIN 92 MG: 100 INJECTION, SOLUTION INTRAVENOUS at 12:27

## 2023-10-11 RX ADMIN — FOSAPREPITANT 150 MG: 150 INJECTION, POWDER, LYOPHILIZED, FOR SOLUTION INTRAVENOUS at 10:53

## 2023-10-11 RX ADMIN — HEPARIN 500 UNITS: 100 SYRINGE at 14:36

## 2023-10-11 RX ADMIN — SODIUM CHLORIDE 1000 ML: 9 INJECTION, SOLUTION INTRAVENOUS at 13:33

## 2023-10-11 RX ADMIN — GEMCITABINE 2041 MG: 38 INJECTION, SOLUTION INTRAVENOUS at 11:35

## 2023-10-11 ASSESSMENT — PAIN SCALES - GENERAL: PAINLEVEL: 2

## 2023-10-11 NOTE — PROGRESS NOTES
"NUTRITION Follow-up NOTE    Reason for Visit:  Chayito England is a 63 y.o. female who presents for Stage IV Pancreatic Cancer    -Adjuvant chemo with FOLFIRINOX from 2018 to March 2019  Disease progression 9/2021 with mets   Continued FOLFIRINOX through Jan 2023 Feb 2023 started gem/abraxane   Aug 2023 showed PD with increased size of the liver lesion and some new pulm nodules  - started tx with gem/cis     Met with patient for nutrition follow up visit.  She is here for 2nd treatment with [place]/Cis    Noted: Hypeglycemia: >200s since 8/2/23 , currently receiving Decadron and Methylpred with treatment.       Lab Results   Component Value Date/Time    GLUCOSE 639 (HH) 10/10/2023 1401     (L) 10/10/2023 1401    K 4.7 10/10/2023 1401    CL 94 (L) 10/10/2023 1401    CO2 25 10/10/2023 1401    ANIONGAP 18 10/10/2023 1401    BUN 12 10/10/2023 1401    CREATININE 0.56 10/10/2023 1401    EGFR >90 10/10/2023 1401    CALCIUM 8.7 10/10/2023 1401    ALBUMIN 3.3 (L) 10/10/2023 1401    ALKPHOS 94 10/10/2023 1401    PROT 7.4 10/10/2023 1401    AST 19 10/10/2023 1401    BILITOT 0.3 10/10/2023 1401    ALT 14 10/10/2023 1401     No results found for: \"VITD25\"    Anthropometrics:  Anthropometrics  Height:  (163.5 cm)  Weight: 87.9 kg (193 lb 10.8 oz)  BMI (Calculated): 32.86  IBW/kg (Dietitian Calculated):  (54.5)  Adjusted Body Weight (kg):  (62.6 kg)    7/19/23: 87.6 kg  8/2/23: 87.3 kg   8/31/23: 89.4 kg   Relatively stable over the past 2.5 months     Wt Readings from Last 10 Encounters:   10/11/23 87.9 kg (193 lb 10.8 oz)   10/10/23 88.6 kg (195 lb 3.5 oz)   10/05/23 88.4 kg (194 lb 14.2 oz)   03/22/23 94 kg (207 lb 3.7 oz)   03/08/23 94.3 kg (207 lb 14.3 oz)   03/02/23 92.4 kg (203 lb 11.3 oz)   02/22/23 93.7 kg (206 lb 9.1 oz)   01/25/23 92 kg (202 lb 13.2 oz)   01/11/23 92.3 kg (203 lb 7.8 oz)   01/10/23 90 kg (198 lb 6.6 oz)        Food And Nutrient Intake:  Food and Nutrient History  Food and Nutrient History: Patient " reports good appetite and oral intake at this time.  States that weight has been relatively stable at 190-194 lbs.  She does not take any oral nutrition supplement shakes as she oral intake currently adequate.  She stills takes Creon with all meals/snacks. (3 caps with meals and 1 cap with snacks)   Reports normal bowel movements.  Did experience mild nausea following last treatment but resolved quickly with anti-emetics.  She reports significant fatigue with this treatment.  GI Symptoms: nausea  GI Symptoms greater than 2 weeks: intermittent      Medication and Complementary/Alternative Medicine Use  Prescription Medication Use: Takes 45 units Lispro ; and also on Glargine once daily 72 units (BG has been significantly elevated since August)      Energy Needs  Calculated Energy Needs Using Equations  Height:  (163.5 cm)  Weight Used for Equation Calculations:  (62.6 kg)  Estimated Energy Needs  Total Energy Estimated Needs (kCal):  (2940-8612)  Estimated Fluid Needs  Total Fluid Estimated Needs (mL):  (1900 mls)  Estimated Protein Needs  Total Protein Estimated Needs (g):  (75-90 g)        Diagnosis   Malnutrition Diagnosis  Patient has Malnutrition Diagnosis: No  Nutrition Diagnosis  Patient has Nutrition Diagnosis: Yes  Diagnosis Status (1): Ongoing  Nutrition Diagnosis 1: Increased nutrient needs  Related to (1): disease process and chemotherapy treatment  As Evidenced by (1): increased metabolic demands to prevent catabolism  Additional Nutrition Diagnosis: Diagnosis 2  Diagnosis Status (2): Ongoing  Nutrition Diagnosis 2: Altered GI function  Related to (2): s/p pancreatectomy and EPI  As Evidenced by (2): dependent upon PERT to assist with absorption and digestion  Diagnosis Status (3): New  Nutrition Diagnosis 3: Altered nutrition related to laboratory values  Related to (3): hyperglycemia (with h/o DM and now receiving steroids with treatment)  As Evidenced by (3): blood glucose ranging >200s since  August    Interventions/Recommendations   Food and Nutrition Delivery  Meals & Snacks: Carbohydrate-modified diet  Goals: Eliminate sugary beverages daily including pop, soda, juice etc (Goal BG <180 mg/dl)  Nutrition Education  Nutrition Education Content: Content related nutrition education  Goals: BG <180 mg/dl  -Discussed with patient that glargine/lispro may require dose adjustments while receiving Decadron, methylpred with treatments, as BG significantly elevated over the past 1-2 months.     Encouraged patient to call PCP with blood sugar log, since starting new treatment.  Patient verbalized understanding.        There are no Patient Instructions on file for this visit.    Monitoring and Evaluation   Food/Nutrient Related History Monitoring  Monitoring and Evaluation Plan: Carbohydrate intake (Avoid sugary beverages; limit concentrated sweets)        Time Spent  Prep time on day of patient encounter: 10 minutes  Time spent directly with patient, family or caregiver: 15 minutes  Additional Time Spent on Patient Care Activities: 0 minutes  Documentation Time: 30 minutes  Other Time Spent: 0 minutes  Total: 55 minutes          Level of Understanding : Good

## 2023-10-11 NOTE — SIGNIFICANT EVENT
10/11/23 0917   Prechemo Checklist   Has the patient been in the hospital, ED, or urgent care since last date of service No   Chemo/Immuno Consent Signed Yes   Protocol/Indications Verified Yes   Confirmed to previous date/time of medication Yes   Compared to previous dose Yes   All medications are dated accurately Yes   Pregnancy Test Negative Not applicable   Parameters Met Yes   BSA/Weight-Height Verified Yes   Dose Calculations Verified Yes

## 2023-10-11 NOTE — PROGRESS NOTES
Patient is here today for infusion - no complication since last being seen.  Independent double check done prior to chemotherapy today.  B/L lung sounds not auscultated.  Denies current chest pain. No acute distress noted.  Patient verbalizes understanding of plan of care.     Patient tolerated infusion well.  Ambulated off unit - gait steady.  no complaints. Call back instructions reviewed.  Verbalized understanding.

## 2023-10-12 ENCOUNTER — APPOINTMENT (OUTPATIENT)
Dept: HEMATOLOGY/ONCOLOGY | Facility: CLINIC | Age: 63
End: 2023-10-12
Payer: COMMERCIAL

## 2023-10-19 DIAGNOSIS — C25.9 MALIGNANT NEOPLASM OF PANCREAS, UNSPECIFIED LOCATION OF MALIGNANCY (MULTI): ICD-10-CM

## 2023-10-24 ENCOUNTER — INFUSION (OUTPATIENT)
Dept: HEMATOLOGY/ONCOLOGY | Facility: CLINIC | Age: 63
End: 2023-10-24
Payer: COMMERCIAL

## 2023-10-24 ENCOUNTER — OFFICE VISIT (OUTPATIENT)
Dept: HEMATOLOGY/ONCOLOGY | Facility: CLINIC | Age: 63
End: 2023-10-24
Payer: COMMERCIAL

## 2023-10-24 VITALS
BODY MASS INDEX: 33.24 KG/M2 | OXYGEN SATURATION: 95 % | DIASTOLIC BLOOD PRESSURE: 80 MMHG | SYSTOLIC BLOOD PRESSURE: 135 MMHG | RESPIRATION RATE: 16 BRPM | TEMPERATURE: 98.1 F | WEIGHT: 195.88 LBS | HEART RATE: 108 BPM

## 2023-10-24 DIAGNOSIS — C25.9 PANCREATIC CANCER METASTASIZED TO LIVER (MULTI): ICD-10-CM

## 2023-10-24 DIAGNOSIS — C25.9 MALIGNANT NEOPLASM OF PANCREAS, UNSPECIFIED LOCATION OF MALIGNANCY (MULTI): ICD-10-CM

## 2023-10-24 DIAGNOSIS — C78.7 PANCREATIC CANCER METASTASIZED TO LIVER (MULTI): ICD-10-CM

## 2023-10-24 LAB
BASOPHILS # BLD AUTO: 0.06 X10*3/UL (ref 0–0.1)
BASOPHILS NFR BLD AUTO: 0.5 %
EOSINOPHIL # BLD AUTO: 0.14 X10*3/UL (ref 0–0.7)
EOSINOPHIL NFR BLD AUTO: 1.1 %
ERYTHROCYTE [DISTWIDTH] IN BLOOD BY AUTOMATED COUNT: 23.3 % (ref 11.5–14.5)
HCT VFR BLD AUTO: 38.8 % (ref 36–46)
HGB BLD-MCNC: 11.6 G/DL (ref 12–16)
HOWELL-JOLLY BOD BLD QL SMEAR: PRESENT
IMM GRANULOCYTES # BLD AUTO: 0.02 X10*3/UL (ref 0–0.7)
IMM GRANULOCYTES NFR BLD AUTO: 0.2 % (ref 0–0.9)
LYMPHOCYTES # BLD AUTO: 4.2 X10*3/UL (ref 1.2–4.8)
LYMPHOCYTES NFR BLD AUTO: 31.7 %
MCH RBC QN AUTO: 24.1 PG (ref 26–34)
MCHC RBC AUTO-ENTMCNC: 29.9 G/DL (ref 32–36)
MCV RBC AUTO: 81 FL (ref 80–100)
MONOCYTES # BLD AUTO: 0.85 X10*3/UL (ref 0.1–1)
MONOCYTES NFR BLD AUTO: 6.4 %
NEUTROPHILS # BLD AUTO: 7.98 X10*3/UL (ref 1.2–7.7)
NEUTROPHILS NFR BLD AUTO: 60.1 %
NEUTS HYPERSEG BLD QL SMEAR: PRESENT
NRBC BLD-RTO: ABNORMAL /100{WBCS}
OVALOCYTES BLD QL SMEAR: NORMAL
PLATELET # BLD AUTO: 272 X10*3/UL (ref 150–450)
PMV BLD AUTO: 9.9 FL (ref 7.5–11.5)
RBC # BLD AUTO: 4.81 X10*6/UL (ref 4–5.2)
RBC MORPH BLD: NORMAL
STOMATOCYTES BLD QL SMEAR: NORMAL
TARGETS BLD QL SMEAR: NORMAL
WBC # BLD AUTO: 13.3 X10*3/UL (ref 4.4–11.3)

## 2023-10-24 PROCEDURE — 2500000004 HC RX 250 GENERAL PHARMACY W/ HCPCS (ALT 636 FOR OP/ED): Mod: SE

## 2023-10-24 PROCEDURE — 99214 OFFICE O/P EST MOD 30 MIN: CPT | Performed by: NURSE PRACTITIONER

## 2023-10-24 PROCEDURE — 36591 DRAW BLOOD OFF VENOUS DEVICE: CPT

## 2023-10-24 PROCEDURE — 3046F HEMOGLOBIN A1C LEVEL >9.0%: CPT | Performed by: NURSE PRACTITIONER

## 2023-10-24 PROCEDURE — 85025 COMPLETE CBC W/AUTO DIFF WBC: CPT

## 2023-10-24 PROCEDURE — 80053 COMPREHEN METABOLIC PANEL: CPT

## 2023-10-24 PROCEDURE — 4010F ACE/ARB THERAPY RXD/TAKEN: CPT | Performed by: NURSE PRACTITIONER

## 2023-10-24 PROCEDURE — 83735 ASSAY OF MAGNESIUM: CPT

## 2023-10-24 RX ORDER — HEPARIN 100 UNIT/ML
SYRINGE INTRAVENOUS
Status: COMPLETED
Start: 2023-10-24 | End: 2023-10-24

## 2023-10-24 RX ORDER — DEXAMETHASONE SODIUM PHOSPHATE 4 MG/ML
6 INJECTION, SOLUTION INTRA-ARTICULAR; INTRALESIONAL; INTRAMUSCULAR; INTRAVENOUS; SOFT TISSUE ONCE
Status: CANCELLED | OUTPATIENT
Start: 2023-11-08

## 2023-10-24 RX ORDER — EPINEPHRINE 0.3 MG/.3ML
0.3 INJECTION SUBCUTANEOUS EVERY 5 MIN PRN
Status: CANCELLED | OUTPATIENT
Start: 2023-11-08

## 2023-10-24 RX ORDER — ALBUTEROL SULFATE 0.83 MG/ML
3 SOLUTION RESPIRATORY (INHALATION) AS NEEDED
Status: CANCELLED | OUTPATIENT
Start: 2023-11-08

## 2023-10-24 RX ORDER — HEPARIN 100 UNIT/ML
500 SYRINGE INTRAVENOUS AS NEEDED
Status: CANCELLED | OUTPATIENT
Start: 2023-10-24

## 2023-10-24 RX ORDER — FAMOTIDINE 10 MG/ML
20 INJECTION INTRAVENOUS ONCE AS NEEDED
Status: CANCELLED | OUTPATIENT
Start: 2023-11-29

## 2023-10-24 RX ORDER — DIPHENHYDRAMINE HYDROCHLORIDE 50 MG/ML
50 INJECTION INTRAMUSCULAR; INTRAVENOUS AS NEEDED
Status: CANCELLED | OUTPATIENT
Start: 2023-11-29

## 2023-10-24 RX ORDER — FAMOTIDINE 10 MG/ML
20 INJECTION INTRAVENOUS ONCE AS NEEDED
Status: CANCELLED | OUTPATIENT
Start: 2023-11-08

## 2023-10-24 RX ORDER — PROCHLORPERAZINE MALEATE 10 MG
10 TABLET ORAL EVERY 6 HOURS PRN
Status: CANCELLED | OUTPATIENT
Start: 2023-11-08

## 2023-10-24 RX ORDER — PROCHLORPERAZINE EDISYLATE 5 MG/ML
10 INJECTION INTRAMUSCULAR; INTRAVENOUS EVERY 6 HOURS PRN
Status: CANCELLED | OUTPATIENT
Start: 2023-11-08

## 2023-10-24 RX ORDER — DIPHENHYDRAMINE HYDROCHLORIDE 50 MG/ML
50 INJECTION INTRAMUSCULAR; INTRAVENOUS AS NEEDED
Status: CANCELLED | OUTPATIENT
Start: 2023-11-08

## 2023-10-24 RX ORDER — HEPARIN SODIUM,PORCINE/PF 10 UNIT/ML
50 SYRINGE (ML) INTRAVENOUS AS NEEDED
Status: CANCELLED | OUTPATIENT
Start: 2023-10-24

## 2023-10-24 RX ORDER — PALONOSETRON 0.05 MG/ML
0.25 INJECTION, SOLUTION INTRAVENOUS ONCE
Status: CANCELLED | OUTPATIENT
Start: 2023-11-08

## 2023-10-24 RX ORDER — PROCHLORPERAZINE MALEATE 10 MG
10 TABLET ORAL EVERY 6 HOURS PRN
Status: CANCELLED | OUTPATIENT
Start: 2023-11-29

## 2023-10-24 RX ORDER — DEXAMETHASONE SODIUM PHOSPHATE 4 MG/ML
6 INJECTION, SOLUTION INTRA-ARTICULAR; INTRALESIONAL; INTRAMUSCULAR; INTRAVENOUS; SOFT TISSUE ONCE
Status: CANCELLED | OUTPATIENT
Start: 2023-11-29

## 2023-10-24 RX ORDER — PALONOSETRON 0.05 MG/ML
0.25 INJECTION, SOLUTION INTRAVENOUS ONCE
Status: CANCELLED | OUTPATIENT
Start: 2023-11-29

## 2023-10-24 RX ORDER — EPINEPHRINE 0.3 MG/.3ML
0.3 INJECTION SUBCUTANEOUS EVERY 5 MIN PRN
Status: CANCELLED | OUTPATIENT
Start: 2023-11-29

## 2023-10-24 RX ORDER — PROCHLORPERAZINE EDISYLATE 5 MG/ML
10 INJECTION INTRAMUSCULAR; INTRAVENOUS EVERY 6 HOURS PRN
Status: CANCELLED | OUTPATIENT
Start: 2023-11-29

## 2023-10-24 RX ORDER — ALBUTEROL SULFATE 0.83 MG/ML
3 SOLUTION RESPIRATORY (INHALATION) AS NEEDED
Status: CANCELLED | OUTPATIENT
Start: 2023-11-29

## 2023-10-24 RX ADMIN — SODIUM CHLORIDE, PRESERVATIVE FREE: 5 INJECTION INTRAVENOUS at 10:45

## 2023-10-24 ASSESSMENT — PAIN SCALES - GENERAL: PAINLEVEL: 5

## 2023-10-24 NOTE — PROGRESS NOTES
Consent:  Verbal consent was requested and obtained from patient on this date for a telehealth visit.    Patient Visit Information:   Visit Type: Follow Up Visit      Cancer History:   Treatment Synopsis:    [facility]- started care at [facility], with Dr. Main.  Transferred to Dr. Blum     HEMATOLOGY & ONCOLOGY PROBLEMS:  1. Stage I pancreatic cancer.         a.  S/p distal pancreatectomy by Dr. Phelps.          b.  Adjuvant chemotherapy with Folfirinox from 2018 to March 2019.          c.  Recurrence 9/2021 with metastatic disease           Treated with : FOLFIRINOX, C1 = 10/26/2021 - continued thru Jan 2023 when she had progression         Feb 2023 - started QOW gem / abraxane          Sep 2023  - started QOW gem/ cis    ** Copied from [address] [place] [address] on 31-Aug-2023 09:32AM by lauren **     Test Name: [employer] xT (XT.V4)  Laboratory Name: [facility]  Specimen Source: Pancreas  Collection Date: 27-Aug-2018  Cancer Type: Pancreatic anaplastic carcinoma  Report Id: [id number]     Molecular Findings:  * Gene: KRAS, Variant: Missense variant (exon 2) - GOF, Biologically Relevant, p.G12D (Allele Frequency - 12.8%)  * Gene: TP53, Variant: Splice region variant - LOF, Biologically Relevant, p.T125T, Splice Site (Allele Frequency - 8.8%)  * Gene: CKS1B, Variant: Amplification, Copy number gain, Biologically Relevant  * Biomarker: Microsatellite Instability, Status: stable  * Biomarker: Tumor Mutation [place] (1.6 Muts/Mb, Percentile: 12.0)  * 3 variants of unknown significance     ** End of copied information **   HPI    - telephone visit completed with Chayito prior to next round of chemotherapy  - tomorrow will be 3rd dose of [place]/cis.    C/o fatigue / intermittent nausea   + mucositis - taking in magic mouth rinse prn  All other ROS reviewed & are negative      Physical Exam - deferred,  telehealth visit     Infusion on 10/24/2023   Component Date Value    WBC 10/24/2023 13.3 (H)     nRBC  10/24/2023      RBC 10/24/2023 4.81     Hemoglobin 10/24/2023 11.6 (L)     Hematocrit 10/24/2023 38.8     MCV 10/24/2023 81     MCH 10/24/2023 24.1 (L)     MCHC 10/24/2023 29.9 (L)     RDW 10/24/2023 23.3 (H)     Platelets 10/24/2023 272     MPV 10/24/2023 9.9     Neutrophils % 10/24/2023 60.1     Immature Granulocytes %,* 10/24/2023 0.2     Lymphocytes % 10/24/2023 31.7     Monocytes % 10/24/2023 6.4     Eosinophils % 10/24/2023 1.1     Basophils % 10/24/2023 0.5     Neutrophils Absolute 10/24/2023 7.98 (H)     Immature Granulocytes Ab* 10/24/2023 0.02     Lymphocytes Absolute 10/24/2023 4.20     Monocytes Absolute 10/24/2023 0.85     Eosinophils Absolute 10/24/2023 0.14     Basophils Absolute 10/24/2023 0.06     RBC Morphology 10/24/2023 See Below     Target Cells 10/24/2023 Few     Ovalocytes 10/24/2023 Few     Stomatocytes 10/24/2023 Few     [place] Bodies 10/24/2023 Present     Hypersegmented Neutrophi* 10/24/2023 Present         Performance Status:  Symptomatic; in bed <50% of the day    Assessment/Plan    Oncology History   Malignant neoplasm of pancreas (CMS/HCC)   9/14/2023 -  Chemotherapy    Gemcitabine / CISplatin, 28 Day Cycles - Pancreatic Adenocarcinoma     9/25/2023 Initial Diagnosis    Malignant neoplasm of pancreas (CMS/HCC)                 Assessment and Plan:   Assessment:    61 y/o woman with Stage Ia pancreatic cancer s/p DPS with Dr. Phelps in 2018 followed by adjuvant FOLFIRINOX until 3/2019.  She has a significant family history  with her mother, father, grandmother, sister, and two aunts - all dying from pancreatic cancer. Family carries BRIP1 c.508-1G>C. As per 's review, mutations in the BRIP1 gene are associated with a 10% lifetime risk of ovarian cancer and an elevated  risk of breast cancer (exact quantification of risk not available). Her  level was found to be markedly elevated at 1614 in late 2021. Follow-up CT scan showed an isolated liver lesion and enlarged  peripancreatic LN.  CT-guided biopsy of liver done  at [facility] was positive for recurrent adenocarcinoma.   She was removed from CD40+ Budigalimab + FOLFIRINOX 2/2 kidney injury -- received 2 cycles FOLFIRINOX resumed 01/2022 -- dose reductions for oxaliplatin, infusional 5FU, irinotecan, 5FU bolus omitted.  Oxaliplatin dropped in July 2022 due to worsening  neuropathy.  Has continued on Q 2 week FOLFIRI with decreasing  levels to 300's.  Had treatment break in september due to vacation.  Restarted on 10/5.  Tolerated well.  Treatment held x 1 dose in 12/2022 for mild COVID +, with complete recovery  and treatment resumed in 1/2023. She had prorgression in 2/2023 in liver and lungs.   - Newly diagnosed PE April 2023     # Resected Pancreatic cancer with subsequent liver metastasis: liver primary  disease-  - Switched  to gemcitabine/ nab-paclitaxel every 2 weeks at [facility] in March 2023  - 4/29/23 - s/p 2 cycles - CT scans wtih slight increase in 1 liver lesion, reviewed with Dr. Blum - overall stable dz   - 5/3/23 - C3D1    - 5/17/23 - C3D15 held due to fall    - head CT negative in ED    - 5/23 - 5/29/23 admitted [facility] with SBO / resolved after NG decompression   Progression of disease noted on CT C/A/P with enlarging liver lesion. Plan to start [place]/cis on 09/13/23    - now s/p 2 doses -       3rd dose - 10/25      4th dose - 11/8  RTC to see Dr. Blum 11/16 with scans prior to visit     Scans again after 4 doses - follow  level   6Obtain [employer] for other targeted options.  - Look for BRIP1 trials.        #PE    - continue anticoagulation lifelong  - as needed oxygen      #mucositis    - continue baking soda rinses    - try magic mouthwash      # Diarrhea:    - improved on enzymes / lomotil.         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 Saunders in gyn/onc  - Last mammogram 3/23     Cancer Staging   No matching staging information was found for the    GIUSEPPE Espinoza-CNP

## 2023-10-25 ENCOUNTER — APPOINTMENT (OUTPATIENT)
Dept: HEMATOLOGY/ONCOLOGY | Facility: CLINIC | Age: 63
End: 2023-10-25
Payer: COMMERCIAL

## 2023-10-25 ENCOUNTER — INFUSION (OUTPATIENT)
Dept: HEMATOLOGY/ONCOLOGY | Facility: CLINIC | Age: 63
End: 2023-10-25
Payer: COMMERCIAL

## 2023-10-25 VITALS
WEIGHT: 195.22 LBS | SYSTOLIC BLOOD PRESSURE: 124 MMHG | BODY MASS INDEX: 33.12 KG/M2 | HEART RATE: 103 BPM | DIASTOLIC BLOOD PRESSURE: 79 MMHG | RESPIRATION RATE: 18 BRPM | TEMPERATURE: 97.3 F | OXYGEN SATURATION: 94 %

## 2023-10-25 DIAGNOSIS — C25.0 MALIGNANT NEOPLASM OF HEAD OF PANCREAS (MULTI): ICD-10-CM

## 2023-10-25 DIAGNOSIS — C25.9 MALIGNANT NEOPLASM OF PANCREAS, UNSPECIFIED LOCATION OF MALIGNANCY (MULTI): Primary | ICD-10-CM

## 2023-10-25 LAB
ALBUMIN SERPL BCP-MCNC: 3.6 G/DL (ref 3.4–5)
ALP SERPL-CCNC: 81 U/L (ref 33–136)
ALT SERPL W P-5'-P-CCNC: 16 U/L (ref 7–45)
ANION GAP SERPL CALC-SCNC: 16 MMOL/L (ref 10–20)
AST SERPL W P-5'-P-CCNC: 20 U/L (ref 9–39)
BILIRUB SERPL-MCNC: 0.3 MG/DL (ref 0–1.2)
BUN SERPL-MCNC: 13 MG/DL (ref 6–23)
CALCIUM SERPL-MCNC: 8.8 MG/DL (ref 8.6–10.6)
CHLORIDE SERPL-SCNC: 94 MMOL/L (ref 98–107)
CO2 SERPL-SCNC: 27 MMOL/L (ref 21–32)
CREAT SERPL-MCNC: 0.52 MG/DL (ref 0.5–1.05)
GFR SERPL CREATININE-BSD FRML MDRD: >90 ML/MIN/1.73M*2
GLUCOSE SERPL-MCNC: 452 MG/DL (ref 74–99)
MAGNESIUM SERPL-MCNC: 1.78 MG/DL (ref 1.6–2.4)
POTASSIUM SERPL-SCNC: 4 MMOL/L (ref 3.5–5.3)
PROT SERPL-MCNC: 7.3 G/DL (ref 6.4–8.2)
SODIUM SERPL-SCNC: 133 MMOL/L (ref 136–145)

## 2023-10-25 PROCEDURE — 96361 HYDRATE IV INFUSION ADD-ON: CPT | Mod: INF

## 2023-10-25 PROCEDURE — 96375 TX/PRO/DX INJ NEW DRUG ADDON: CPT | Mod: INF

## 2023-10-25 PROCEDURE — 96417 CHEMO IV INFUS EACH ADDL SEQ: CPT

## 2023-10-25 PROCEDURE — 96360 HYDRATION IV INFUSION INIT: CPT | Mod: INF

## 2023-10-25 PROCEDURE — 96413 CHEMO IV INFUSION 1 HR: CPT

## 2023-10-25 PROCEDURE — 2500000004 HC RX 250 GENERAL PHARMACY W/ HCPCS (ALT 636 FOR OP/ED): Mod: SE | Performed by: NURSE PRACTITIONER

## 2023-10-25 PROCEDURE — 2500000004 HC RX 250 GENERAL PHARMACY W/ HCPCS (ALT 636 FOR OP/ED): Mod: JZ,SE | Performed by: NURSE PRACTITIONER

## 2023-10-25 PROCEDURE — 96367 TX/PROPH/DG ADDL SEQ IV INF: CPT

## 2023-10-25 RX ORDER — PROCHLORPERAZINE MALEATE 10 MG
10 TABLET ORAL EVERY 6 HOURS PRN
Status: DISCONTINUED | OUTPATIENT
Start: 2023-10-25 | End: 2023-10-25 | Stop reason: HOSPADM

## 2023-10-25 RX ORDER — DIPHENHYDRAMINE HYDROCHLORIDE 50 MG/ML
50 INJECTION INTRAMUSCULAR; INTRAVENOUS AS NEEDED
Status: DISCONTINUED | OUTPATIENT
Start: 2023-10-25 | End: 2023-10-25 | Stop reason: HOSPADM

## 2023-10-25 RX ORDER — DEXAMETHASONE SODIUM PHOSPHATE 4 MG/ML
6 INJECTION, SOLUTION INTRA-ARTICULAR; INTRALESIONAL; INTRAMUSCULAR; INTRAVENOUS; SOFT TISSUE ONCE
Status: COMPLETED | OUTPATIENT
Start: 2023-10-25 | End: 2023-10-25

## 2023-10-25 RX ORDER — FAMOTIDINE 10 MG/ML
20 INJECTION INTRAVENOUS ONCE AS NEEDED
Status: DISCONTINUED | OUTPATIENT
Start: 2023-10-25 | End: 2023-10-25 | Stop reason: HOSPADM

## 2023-10-25 RX ORDER — PROCHLORPERAZINE EDISYLATE 5 MG/ML
10 INJECTION INTRAMUSCULAR; INTRAVENOUS EVERY 6 HOURS PRN
Status: DISCONTINUED | OUTPATIENT
Start: 2023-10-25 | End: 2023-10-25 | Stop reason: HOSPADM

## 2023-10-25 RX ORDER — ALBUTEROL SULFATE 0.83 MG/ML
3 SOLUTION RESPIRATORY (INHALATION) AS NEEDED
Status: DISCONTINUED | OUTPATIENT
Start: 2023-10-25 | End: 2023-10-25 | Stop reason: HOSPADM

## 2023-10-25 RX ORDER — PALONOSETRON 0.05 MG/ML
0.25 INJECTION, SOLUTION INTRAVENOUS ONCE
Status: COMPLETED | OUTPATIENT
Start: 2023-10-25 | End: 2023-10-25

## 2023-10-25 RX ORDER — EPINEPHRINE 0.3 MG/.3ML
0.3 INJECTION SUBCUTANEOUS EVERY 5 MIN PRN
Status: DISCONTINUED | OUTPATIENT
Start: 2023-10-25 | End: 2023-10-25 | Stop reason: HOSPADM

## 2023-10-25 RX ADMIN — SODIUM CHLORIDE 1000 ML: 9 INJECTION, SOLUTION INTRAVENOUS at 13:55

## 2023-10-25 RX ADMIN — CISPLATIN 92 MG: 100 INJECTION, SOLUTION INTRAVENOUS at 12:37

## 2023-10-25 RX ADMIN — FOSAPREPITANT 150 MG: 150 INJECTION, POWDER, LYOPHILIZED, FOR SOLUTION INTRAVENOUS at 11:14

## 2023-10-25 RX ADMIN — PALONOSETRON 250 MCG: 0.05 INJECTION, SOLUTION INTRAVENOUS at 11:09

## 2023-10-25 RX ADMIN — POTASSIUM CHLORIDE 1000 ML/HR: 2 INJECTION, SOLUTION, CONCENTRATE INTRAVENOUS at 10:05

## 2023-10-25 RX ADMIN — GEMCITABINE 2041 MG: 38 INJECTION, SOLUTION INTRAVENOUS at 11:55

## 2023-10-25 RX ADMIN — DEXAMETHASONE SODIUM PHOSPHATE 6 MG: 4 INJECTION, SOLUTION INTRA-ARTICULAR; INTRALESIONAL; INTRAMUSCULAR; INTRAVENOUS; SOFT TISSUE at 11:12

## 2023-10-25 ASSESSMENT — PAIN SCALES - GENERAL: PAINLEVEL: 3

## 2023-10-25 NOTE — PROGRESS NOTES
Patient is here today for infusion - no complication since last being seen-  independant double check done prior to chemotherapy today-   b/h/ lung sounds not auscultated  patient verbalizes understanding of plan of care     Educated about fluid hydration with this specific tx plan     Flush and line discontinue without problem

## 2023-10-28 ENCOUNTER — NURSE TRIAGE (OUTPATIENT)
Dept: ADMISSION | Facility: HOSPITAL | Age: 63
End: 2023-10-28
Payer: COMMERCIAL

## 2023-10-28 NOTE — TELEPHONE ENCOUNTER
"      Patient called the Nurse OnCall Triage Line Saturday morning with c/o of intense upper mid-to right abdominal pain last night - level of pain was an \"8\" , had this pain at the same location before, patient stated \" last time I had this intense upper belly pain before , my tumor doubled in size when Dr. Blum did a scan.\"  Current pain is a \"3\" on a scale of 0-10 for the last -6 to 7 hrs.  Patient took pain medication at 12 am.   Denies fever, chills, diarrhea, constipation, nausea or vomiting.  Denies SOB or increased abdominal girth.  Patient instructed to call back if pain level is \"7\" or greater, or has bloating, fever, chills, blood in urine or stool,  nausea, vomiting., dizziness or lightheadedness, or increase in abdominal girth.  100% understanding by the patient Let her know we are available at the number given 24/7.  .    Message sent to Dr. Blum just to inform.  "

## 2023-11-01 ENCOUNTER — TELEPHONE (OUTPATIENT)
Dept: PALLIATIVE MEDICINE | Facility: HOSPITAL | Age: 63
End: 2023-11-01
Payer: COMMERCIAL

## 2023-11-01 ENCOUNTER — TELEMEDICINE (OUTPATIENT)
Dept: PALLIATIVE MEDICINE | Facility: CLINIC | Age: 63
End: 2023-11-01
Payer: COMMERCIAL

## 2023-11-01 DIAGNOSIS — C25.9 MALIGNANT NEOPLASM OF PANCREAS, UNSPECIFIED LOCATION OF MALIGNANCY (MULTI): Primary | ICD-10-CM

## 2023-11-01 DIAGNOSIS — F41.9 ANXIETY: Primary | ICD-10-CM

## 2023-11-01 DIAGNOSIS — G89.3 CANCER RELATED PAIN: ICD-10-CM

## 2023-11-01 DIAGNOSIS — G47.00 INSOMNIA, UNSPECIFIED TYPE: ICD-10-CM

## 2023-11-01 RX ORDER — ZOLPIDEM TARTRATE 10 MG/1
10 TABLET ORAL NIGHTLY
Qty: 30 TABLET | Refills: 3 | Status: SHIPPED | OUTPATIENT
Start: 2023-11-01 | End: 2023-11-16 | Stop reason: SDUPTHER

## 2023-11-01 RX ORDER — ALPRAZOLAM 1 MG/1
1 TABLET ORAL 2 TIMES DAILY PRN
Qty: 60 TABLET | Refills: 3 | Status: SHIPPED | OUTPATIENT
Start: 2023-11-01 | End: 2023-12-01

## 2023-11-01 RX ORDER — FENTANYL 100 UG/H
1 PATCH TRANSDERMAL
Qty: 10 PATCH | Refills: 0 | Status: SHIPPED | OUTPATIENT
Start: 2023-11-01 | End: 2023-11-16 | Stop reason: SDUPTHER

## 2023-11-01 RX ORDER — OXYCODONE HYDROCHLORIDE 20 MG/1
20 TABLET ORAL EVERY 4 HOURS PRN
Qty: 180 TABLET | Refills: 0 | Status: SHIPPED | OUTPATIENT
Start: 2023-11-01 | End: 2023-11-16 | Stop reason: DRUGHIGH

## 2023-11-01 NOTE — TELEPHONE ENCOUNTER
Spoke with Chayito as she was scheduled for a phone appointment today and should not have been. She states that is hoping to see Dr. Blum at [facility] on 11/16/23. She states that she was scheduled to see him at [facility] . Will schedule an appointment with Randi Parks NP on that day in an attempt to align appointments. Chayito states that she will need refills of  Oxycodone and Fentanyl  Patches on 11/5/23.  Ambien and Xanax are also needing to be refilled. Assured Chayito that refill requests will  be sent to Provider K[facility].     OARRS report reviewed and reflects  prescription history, no aberrancy noted. Per OARRS, patient last filled:   Oxycodone 20mg on 10/5/23- #180-30 day supply   Fentanyl 100 mcg on 10/5/23-#10-30 day supply   Ambien 10 mg on 9/30/23 -#30-30 day supply   Xanax on 10/21/23-#60- 30 day supply. ( This is not due until 11/19/23 but Chayito made mention  that [facility] stated there are no more refills) Per last visit with Brody Parks  on 10/5/23 patient to continue above. . Patient with follow up visit scheduled with Brody Parks on 11/16/23. . Patient updated that medication will be sent to [facility]. Refill request routed to provider.

## 2023-11-03 ENCOUNTER — TELEPHONE (OUTPATIENT)
Dept: ADMISSION | Facility: HOSPITAL | Age: 63
End: 2023-11-03
Payer: COMMERCIAL

## 2023-11-03 DIAGNOSIS — R60.1 GENERALIZED EDEMA: Primary | ICD-10-CM

## 2023-11-03 RX ORDER — FUROSEMIDE 20 MG/1
20 TABLET ORAL EVERY MORNING
Qty: 90 TABLET | Refills: 3 | Status: SHIPPED | OUTPATIENT
Start: 2023-11-03

## 2023-11-03 NOTE — TELEPHONE ENCOUNTER
Chayito England called the refill line for Lasix. Medication pended to team to approve and submit.   Next FUV 11/16.

## 2023-11-07 ENCOUNTER — INFUSION (OUTPATIENT)
Dept: HEMATOLOGY/ONCOLOGY | Facility: CLINIC | Age: 63
End: 2023-11-07
Payer: COMMERCIAL

## 2023-11-07 ENCOUNTER — TELEPHONE (OUTPATIENT)
Dept: HEMATOLOGY/ONCOLOGY | Facility: HOSPITAL | Age: 63
End: 2023-11-07
Payer: COMMERCIAL

## 2023-11-07 VITALS
HEART RATE: 102 BPM | WEIGHT: 199.52 LBS | OXYGEN SATURATION: 96 % | RESPIRATION RATE: 16 BRPM | DIASTOLIC BLOOD PRESSURE: 85 MMHG | TEMPERATURE: 96.8 F | BODY MASS INDEX: 33.85 KG/M2 | SYSTOLIC BLOOD PRESSURE: 149 MMHG

## 2023-11-07 DIAGNOSIS — T45.1X5A CHEMOTHERAPY-INDUCED NEUROPATHY (MULTI): Primary | ICD-10-CM

## 2023-11-07 DIAGNOSIS — C25.9 MALIGNANT NEOPLASM OF PANCREAS, UNSPECIFIED LOCATION OF MALIGNANCY (MULTI): ICD-10-CM

## 2023-11-07 DIAGNOSIS — G62.0 CHEMOTHERAPY-INDUCED NEUROPATHY (MULTI): Primary | ICD-10-CM

## 2023-11-07 LAB
BASOPHILS # BLD AUTO: 0.03 X10*3/UL (ref 0–0.1)
BASOPHILS NFR BLD AUTO: 0.2 %
EOSINOPHIL # BLD AUTO: 0.08 X10*3/UL (ref 0–0.7)
EOSINOPHIL NFR BLD AUTO: 0.6 %
ERYTHROCYTE [DISTWIDTH] IN BLOOD BY AUTOMATED COUNT: 24.4 % (ref 11.5–14.5)
HCT VFR BLD AUTO: 36.5 % (ref 36–46)
HGB BLD-MCNC: 10.9 G/DL (ref 12–16)
HOWELL-JOLLY BOD BLD QL SMEAR: PRESENT
IMM GRANULOCYTES # BLD AUTO: 0.04 X10*3/UL (ref 0–0.7)
IMM GRANULOCYTES NFR BLD AUTO: 0.3 % (ref 0–0.9)
LYMPHOCYTES # BLD AUTO: 6.31 X10*3/UL (ref 1.2–4.8)
LYMPHOCYTES NFR BLD AUTO: 47.3 %
MCH RBC QN AUTO: 24.7 PG (ref 26–34)
MCHC RBC AUTO-ENTMCNC: 29.9 G/DL (ref 32–36)
MCV RBC AUTO: 83 FL (ref 80–100)
MONOCYTES # BLD AUTO: 1.49 X10*3/UL (ref 0.1–1)
MONOCYTES NFR BLD AUTO: 11.2 %
NEUTROPHILS # BLD AUTO: 5.4 X10*3/UL (ref 1.2–7.7)
NEUTROPHILS NFR BLD AUTO: 40.4 %
NRBC BLD-RTO: ABNORMAL /100{WBCS}
OVALOCYTES BLD QL SMEAR: NORMAL
PLATELET # BLD AUTO: 265 X10*3/UL (ref 150–450)
POLYCHROMASIA BLD QL SMEAR: NORMAL
RBC # BLD AUTO: 4.42 X10*6/UL (ref 4–5.2)
RBC MORPH BLD: NORMAL
TARGETS BLD QL SMEAR: NORMAL
WBC # BLD AUTO: 13.4 X10*3/UL (ref 4.4–11.3)

## 2023-11-07 PROCEDURE — 86301 IMMUNOASSAY TUMOR CA 19-9: CPT

## 2023-11-07 PROCEDURE — 2500000004 HC RX 250 GENERAL PHARMACY W/ HCPCS (ALT 636 FOR OP/ED)

## 2023-11-07 PROCEDURE — 85025 COMPLETE CBC W/AUTO DIFF WBC: CPT

## 2023-11-07 PROCEDURE — 83735 ASSAY OF MAGNESIUM: CPT

## 2023-11-07 PROCEDURE — 80053 COMPREHEN METABOLIC PANEL: CPT

## 2023-11-07 PROCEDURE — 36591 DRAW BLOOD OFF VENOUS DEVICE: CPT

## 2023-11-07 RX ORDER — DULOXETIN HYDROCHLORIDE 30 MG/1
30 CAPSULE, DELAYED RELEASE ORAL EVERY MORNING
Qty: 30 CAPSULE | Refills: 2 | Status: SHIPPED | OUTPATIENT
Start: 2023-11-07

## 2023-11-07 RX ORDER — HEPARIN SODIUM,PORCINE/PF 10 UNIT/ML
50 SYRINGE (ML) INTRAVENOUS AS NEEDED
Status: CANCELLED | OUTPATIENT
Start: 2023-11-07

## 2023-11-07 RX ORDER — DULOXETIN HYDROCHLORIDE 60 MG/1
60 CAPSULE, DELAYED RELEASE ORAL NIGHTLY
Qty: 30 CAPSULE | Refills: 2 | Status: SHIPPED | OUTPATIENT
Start: 2023-11-07

## 2023-11-07 RX ORDER — HEPARIN 100 UNIT/ML
500 SYRINGE INTRAVENOUS AS NEEDED
Status: CANCELLED | OUTPATIENT
Start: 2023-11-07

## 2023-11-07 RX ORDER — HEPARIN 100 UNIT/ML
SYRINGE INTRAVENOUS
Status: COMPLETED
Start: 2023-11-07 | End: 2023-11-07

## 2023-11-07 RX ADMIN — SODIUM CHLORIDE, PRESERVATIVE FREE: 5 INJECTION INTRAVENOUS at 15:45

## 2023-11-07 ASSESSMENT — PAIN SCALES - GENERAL: PAINLEVEL: 4

## 2023-11-08 ENCOUNTER — INFUSION (OUTPATIENT)
Dept: HEMATOLOGY/ONCOLOGY | Facility: CLINIC | Age: 63
End: 2023-11-08
Payer: COMMERCIAL

## 2023-11-08 VITALS
DIASTOLIC BLOOD PRESSURE: 81 MMHG | OXYGEN SATURATION: 90 % | SYSTOLIC BLOOD PRESSURE: 137 MMHG | RESPIRATION RATE: 18 BRPM | HEART RATE: 99 BPM | WEIGHT: 197.86 LBS | BODY MASS INDEX: 33.57 KG/M2 | TEMPERATURE: 97.5 F

## 2023-11-08 DIAGNOSIS — C25.9 MALIGNANT NEOPLASM OF PANCREAS, UNSPECIFIED LOCATION OF MALIGNANCY (MULTI): ICD-10-CM

## 2023-11-08 LAB
ALBUMIN SERPL BCP-MCNC: 3.6 G/DL (ref 3.4–5)
ALP SERPL-CCNC: 95 U/L (ref 33–136)
ALT SERPL W P-5'-P-CCNC: 19 U/L (ref 7–45)
ANION GAP SERPL CALC-SCNC: 20 MMOL/L (ref 10–20)
AST SERPL W P-5'-P-CCNC: 21 U/L (ref 9–39)
BILIRUB SERPL-MCNC: 0.3 MG/DL (ref 0–1.2)
BUN SERPL-MCNC: 9 MG/DL (ref 6–23)
CALCIUM SERPL-MCNC: 8.2 MG/DL (ref 8.6–10.6)
CANCER AG19-9 SERPL-ACNC: ABNORMAL U/ML
CHLORIDE SERPL-SCNC: 94 MMOL/L (ref 98–107)
CO2 SERPL-SCNC: 24 MMOL/L (ref 21–32)
CREAT SERPL-MCNC: 0.55 MG/DL (ref 0.5–1.05)
GFR SERPL CREATININE-BSD FRML MDRD: >90 ML/MIN/1.73M*2
GLUCOSE SERPL-MCNC: 451 MG/DL (ref 74–99)
MAGNESIUM SERPL-MCNC: 1.77 MG/DL (ref 1.6–2.4)
POTASSIUM SERPL-SCNC: 3.5 MMOL/L (ref 3.5–5.3)
PROT SERPL-MCNC: 6.9 G/DL (ref 6.4–8.2)
SODIUM SERPL-SCNC: 134 MMOL/L (ref 136–145)

## 2023-11-08 PROCEDURE — 2500000004 HC RX 250 GENERAL PHARMACY W/ HCPCS (ALT 636 FOR OP/ED): Mod: SE | Performed by: NURSE PRACTITIONER

## 2023-11-08 PROCEDURE — 96413 CHEMO IV INFUSION 1 HR: CPT

## 2023-11-08 PROCEDURE — 2500000004 HC RX 250 GENERAL PHARMACY W/ HCPCS (ALT 636 FOR OP/ED): Mod: JZ,SE | Performed by: NURSE PRACTITIONER

## 2023-11-08 PROCEDURE — 2500000004 HC RX 250 GENERAL PHARMACY W/ HCPCS (ALT 636 FOR OP/ED): Mod: SE | Performed by: INTERNAL MEDICINE

## 2023-11-08 PROCEDURE — 96417 CHEMO IV INFUS EACH ADDL SEQ: CPT

## 2023-11-08 PROCEDURE — 96375 TX/PRO/DX INJ NEW DRUG ADDON: CPT | Mod: INF

## 2023-11-08 PROCEDURE — 96367 TX/PROPH/DG ADDL SEQ IV INF: CPT

## 2023-11-08 RX ORDER — PROCHLORPERAZINE MALEATE 10 MG
10 TABLET ORAL EVERY 6 HOURS PRN
Status: DISCONTINUED | OUTPATIENT
Start: 2023-11-08 | End: 2023-11-08 | Stop reason: HOSPADM

## 2023-11-08 RX ORDER — HEPARIN 100 UNIT/ML
500 SYRINGE INTRAVENOUS AS NEEDED
Status: DISCONTINUED | OUTPATIENT
Start: 2023-11-08 | End: 2023-11-08 | Stop reason: HOSPADM

## 2023-11-08 RX ORDER — DIPHENHYDRAMINE HYDROCHLORIDE 50 MG/ML
50 INJECTION INTRAMUSCULAR; INTRAVENOUS AS NEEDED
Status: DISCONTINUED | OUTPATIENT
Start: 2023-11-08 | End: 2023-11-08 | Stop reason: HOSPADM

## 2023-11-08 RX ORDER — HEPARIN 100 UNIT/ML
500 SYRINGE INTRAVENOUS AS NEEDED
Status: CANCELLED | OUTPATIENT
Start: 2023-11-08

## 2023-11-08 RX ORDER — HEPARIN SODIUM,PORCINE/PF 10 UNIT/ML
50 SYRINGE (ML) INTRAVENOUS AS NEEDED
OUTPATIENT
Start: 2023-11-08

## 2023-11-08 RX ORDER — HEPARIN SODIUM,PORCINE/PF 10 UNIT/ML
50 SYRINGE (ML) INTRAVENOUS AS NEEDED
Status: DISCONTINUED | OUTPATIENT
Start: 2023-11-08 | End: 2023-11-08 | Stop reason: HOSPADM

## 2023-11-08 RX ORDER — ALBUTEROL SULFATE 0.83 MG/ML
3 SOLUTION RESPIRATORY (INHALATION) AS NEEDED
Status: DISCONTINUED | OUTPATIENT
Start: 2023-11-08 | End: 2023-11-08 | Stop reason: HOSPADM

## 2023-11-08 RX ORDER — PALONOSETRON 0.05 MG/ML
0.25 INJECTION, SOLUTION INTRAVENOUS ONCE
Status: COMPLETED | OUTPATIENT
Start: 2023-11-08 | End: 2023-11-08

## 2023-11-08 RX ORDER — FAMOTIDINE 10 MG/ML
20 INJECTION INTRAVENOUS ONCE AS NEEDED
Status: DISCONTINUED | OUTPATIENT
Start: 2023-11-08 | End: 2023-11-08 | Stop reason: HOSPADM

## 2023-11-08 RX ORDER — PROCHLORPERAZINE EDISYLATE 5 MG/ML
10 INJECTION INTRAMUSCULAR; INTRAVENOUS EVERY 6 HOURS PRN
Status: DISCONTINUED | OUTPATIENT
Start: 2023-11-08 | End: 2023-11-08 | Stop reason: HOSPADM

## 2023-11-08 RX ORDER — DEXAMETHASONE SODIUM PHOSPHATE 4 MG/ML
6 INJECTION, SOLUTION INTRA-ARTICULAR; INTRALESIONAL; INTRAMUSCULAR; INTRAVENOUS; SOFT TISSUE ONCE
Status: COMPLETED | OUTPATIENT
Start: 2023-11-08 | End: 2023-11-08

## 2023-11-08 RX ORDER — EPINEPHRINE 0.3 MG/.3ML
0.3 INJECTION SUBCUTANEOUS EVERY 5 MIN PRN
Status: DISCONTINUED | OUTPATIENT
Start: 2023-11-08 | End: 2023-11-08 | Stop reason: HOSPADM

## 2023-11-08 RX ADMIN — POTASSIUM CHLORIDE 1000 ML/HR: 2 INJECTION, SOLUTION, CONCENTRATE INTRAVENOUS at 09:06

## 2023-11-08 RX ADMIN — CISPLATIN 92 MG: 100 INJECTION, SOLUTION INTRAVENOUS at 11:33

## 2023-11-08 RX ADMIN — DEXAMETHASONE SODIUM PHOSPHATE 6 MG: 4 INJECTION, SOLUTION INTRA-ARTICULAR; INTRALESIONAL; INTRAMUSCULAR; INTRAVENOUS; SOFT TISSUE at 09:07

## 2023-11-08 RX ADMIN — PALONOSETRON 250 MCG: 0.05 INJECTION, SOLUTION INTRAVENOUS at 09:06

## 2023-11-08 RX ADMIN — SODIUM CHLORIDE 1000 ML: 9 INJECTION, SOLUTION INTRAVENOUS at 12:42

## 2023-11-08 RX ADMIN — FOSAPREPITANT 150 MG: 150 INJECTION, POWDER, LYOPHILIZED, FOR SOLUTION INTRAVENOUS at 10:11

## 2023-11-08 RX ADMIN — HEPARIN 500 UNITS: 100 SYRINGE at 13:43

## 2023-11-08 RX ADMIN — GEMCITABINE 2040.6 MG: 38 INJECTION, SOLUTION INTRAVENOUS at 10:47

## 2023-11-08 ASSESSMENT — PAIN SCALES - GENERAL: PAINLEVEL: 5

## 2023-11-08 NOTE — PROGRESS NOTES
Port flushed and hepranized per protocol. Deaccessed. No complications. Treatment completed. Patient tolerated well. Remained asymptomatic throughout. Discharged home ambulatory offering no complaints.

## 2023-11-08 NOTE — SIGNIFICANT EVENT
11/08/23 0833   Prechemo Checklist   Has the patient been in the hospital, ED, or urgent care since last date of service No   Chemo/Immuno Consent Signed Yes   Protocol/Indications Verified Yes   Confirmed to previous date/time of medication Yes   Compared to previous dose Yes   All medications are dated accurately Yes   Pregnancy Test Negative Not applicable   Parameters Met Yes   BSA/Weight-Height Verified Yes   Dose Calculations Verified Yes

## 2023-11-13 ENCOUNTER — ANCILLARY PROCEDURE (OUTPATIENT)
Dept: RADIOLOGY | Facility: CLINIC | Age: 63
End: 2023-11-13
Payer: COMMERCIAL

## 2023-11-13 DIAGNOSIS — C25.0 MALIGNANT NEOPLASM OF HEAD OF PANCREAS (MULTI): ICD-10-CM

## 2023-11-13 PROCEDURE — 2550000001 HC RX 255 CONTRASTS: Performed by: NURSE PRACTITIONER

## 2023-11-13 PROCEDURE — 74177 CT ABD & PELVIS W/CONTRAST: CPT | Performed by: STUDENT IN AN ORGANIZED HEALTH CARE EDUCATION/TRAINING PROGRAM

## 2023-11-13 PROCEDURE — 71260 CT THORAX DX C+: CPT | Performed by: STUDENT IN AN ORGANIZED HEALTH CARE EDUCATION/TRAINING PROGRAM

## 2023-11-13 PROCEDURE — 74177 CT ABD & PELVIS W/CONTRAST: CPT

## 2023-11-13 RX ADMIN — IOHEXOL 70 ML: 350 INJECTION, SOLUTION INTRAVENOUS at 10:52

## 2023-11-14 ENCOUNTER — APPOINTMENT (OUTPATIENT)
Dept: HEMATOLOGY/ONCOLOGY | Facility: CLINIC | Age: 63
End: 2023-11-14
Payer: COMMERCIAL

## 2023-11-16 ENCOUNTER — OFFICE VISIT (OUTPATIENT)
Dept: PALLIATIVE MEDICINE | Facility: HOSPITAL | Age: 63
End: 2023-11-16
Payer: COMMERCIAL

## 2023-11-16 ENCOUNTER — LAB (OUTPATIENT)
Dept: LAB | Facility: HOSPITAL | Age: 63
End: 2023-11-16
Payer: COMMERCIAL

## 2023-11-16 ENCOUNTER — OFFICE VISIT (OUTPATIENT)
Dept: HEMATOLOGY/ONCOLOGY | Facility: HOSPITAL | Age: 63
End: 2023-11-16
Payer: COMMERCIAL

## 2023-11-16 VITALS
TEMPERATURE: 96.6 F | OXYGEN SATURATION: 98 % | RESPIRATION RATE: 18 BRPM | DIASTOLIC BLOOD PRESSURE: 82 MMHG | HEART RATE: 102 BPM | SYSTOLIC BLOOD PRESSURE: 145 MMHG | WEIGHT: 190.8 LBS | BODY MASS INDEX: 32.38 KG/M2

## 2023-11-16 DIAGNOSIS — G47.00 INSOMNIA, UNSPECIFIED TYPE: ICD-10-CM

## 2023-11-16 DIAGNOSIS — C25.9 MALIGNANT NEOPLASM OF PANCREAS, UNSPECIFIED LOCATION OF MALIGNANCY (MULTI): ICD-10-CM

## 2023-11-16 DIAGNOSIS — C25.0 MALIGNANT NEOPLASM OF HEAD OF PANCREAS (MULTI): ICD-10-CM

## 2023-11-16 DIAGNOSIS — G89.3 CANCER RELATED PAIN: ICD-10-CM

## 2023-11-16 DIAGNOSIS — Z51.5 PALLIATIVE CARE ENCOUNTER: Primary | ICD-10-CM

## 2023-11-16 PROCEDURE — 4010F ACE/ARB THERAPY RXD/TAKEN: CPT | Performed by: INTERNAL MEDICINE

## 2023-11-16 PROCEDURE — 3046F HEMOGLOBIN A1C LEVEL >9.0%: CPT | Performed by: INTERNAL MEDICINE

## 2023-11-16 PROCEDURE — 99213 OFFICE O/P EST LOW 20 MIN: CPT

## 2023-11-16 PROCEDURE — 80307 DRUG TEST PRSMV CHEM ANLYZR: CPT

## 2023-11-16 PROCEDURE — 99214 OFFICE O/P EST MOD 30 MIN: CPT | Mod: 25 | Performed by: INTERNAL MEDICINE

## 2023-11-16 PROCEDURE — 3046F HEMOGLOBIN A1C LEVEL >9.0%: CPT

## 2023-11-16 PROCEDURE — 3077F SYST BP >= 140 MM HG: CPT | Performed by: INTERNAL MEDICINE

## 2023-11-16 PROCEDURE — 99214 OFFICE O/P EST MOD 30 MIN: CPT | Performed by: INTERNAL MEDICINE

## 2023-11-16 PROCEDURE — 4010F ACE/ARB THERAPY RXD/TAKEN: CPT

## 2023-11-16 PROCEDURE — 99213 OFFICE O/P EST LOW 20 MIN: CPT | Mod: 25

## 2023-11-16 PROCEDURE — 80361 OPIATES 1 OR MORE: CPT

## 2023-11-16 PROCEDURE — 3079F DIAST BP 80-89 MM HG: CPT | Performed by: INTERNAL MEDICINE

## 2023-11-16 PROCEDURE — 80354 DRUG SCREENING FENTANYL: CPT

## 2023-11-16 RX ORDER — OXYCODONE HYDROCHLORIDE 20 MG/1
20 TABLET ORAL EVERY 4 HOURS PRN
Qty: 180 TABLET | Refills: 0 | Status: SHIPPED | OUTPATIENT
Start: 2023-11-16 | End: 2023-12-15 | Stop reason: SDUPTHER

## 2023-11-16 RX ORDER — PREGABALIN 200 MG/1
200 CAPSULE ORAL 3 TIMES DAILY
Qty: 90 CAPSULE | Refills: 3 | Status: SHIPPED | OUTPATIENT
Start: 2023-11-16 | End: 2023-12-16

## 2023-11-16 RX ORDER — FENTANYL 100 UG/H
1 PATCH TRANSDERMAL
Qty: 10 PATCH | Refills: 0 | Status: SHIPPED | OUTPATIENT
Start: 2023-11-16 | End: 2023-12-15 | Stop reason: SDUPTHER

## 2023-11-16 RX ORDER — ZOLPIDEM TARTRATE 10 MG/1
10 TABLET ORAL NIGHTLY
Qty: 30 TABLET | Refills: 3 | Status: SHIPPED | OUTPATIENT
Start: 2023-11-16 | End: 2023-12-16

## 2023-11-16 ASSESSMENT — PAIN SCALES - GENERAL: PAINLEVEL: 5

## 2023-11-16 NOTE — PROGRESS NOTES
"  SUPPORTIVE AND PALLIATIVE ONCOLOGY OUTPATIENT FOLLOW-UP      SERVICE DATE: 11/16/2023    Subjective   HISTORY OF PRESENT ILLNESS: Chayito England is a 63 y.o. female who presents with a history of pancreatic cancer. She is s/p distal pancreatectomy 8/2018 with Dr. Phelps. She received adjuvant chemotherapy with  Folfirinox until March 2019. Late this year her  level was elevated. She had a f/u CT scan that revealed an isolated liver lesion and enlarged peripancreatic lymph node. CT guided biopsy of the liver was completed that showed recurrent adenocarcinoma.  She start treatment with Clinical trial [id number] and Folfirinox 10/26/21. This was discontinued d/t kidney injury. She resumed Folfirinox 1/5/22. CT scan 2/2023 revealed disease progression in the liver and lungs. Started treatment with gemcitabine/nab-paclitaxel  3/8/23.     Pain Assessment:  Pain Score: 6/10  Location: abdomen and back  Description: throbbing pain that comes and goes. At its worst, pain is an 8-9/10. She states that the pain improves to a 5-6/10 with oxycodone. She has been taking this every 4 hours 5-6 times per day.     Symptom Assessment:  Pain:somewhat as mentioned above  Headache: none  Dizziness:none  Lack of energy: a little  Difficulty sleeping: very much states she is up every 1-2 hours   Worrying: none  Anxiety: a little. Controlled on Xanax BID.   Depression: none  Pain in mouth/swallowing: none  Dry mouth: none  Taste changes: none  Shortness of breath: none  Lack of appetite: none. Reports trouble swallowing and feeling like food is getting \"stuck\"   Nausea: very much   Vomiting: a little   Constipation: none  Diarrhea: none  Sore muscles: none  Numbness or tingling in hands/feet/other: somewhat in hands and feet. Numbness and tingling. Denies burning.   Weight loss: none  Other: increased falls due to weakness and dizziness.      Information obtained from: interview of patient and interview of " family  ______________________________________________________________________        Objective     Infusion on 11/07/2023   Component Date Value Ref Range Status    WBC 11/07/2023 13.4 (H)  4.4 - 11.3 x10*3/uL Final    nRBC 11/07/2023    Final    Not Measured    RBC 11/07/2023 4.42  4.00 - 5.20 x10*6/uL Final    Hemoglobin 11/07/2023 10.9 (L)  12.0 - 16.0 g/dL Final    Hematocrit 11/07/2023 36.5  36.0 - 46.0 % Final    MCV 11/07/2023 83  80 - 100 fL Final    MCH 11/07/2023 24.7 (L)  26.0 - 34.0 pg Final    MCHC 11/07/2023 29.9 (L)  32.0 - 36.0 g/dL Final    RDW 11/07/2023 24.4 (H)  11.5 - 14.5 % Final    Platelets 11/07/2023 265  150 - 450 x10*3/uL Final    Neutrophils % 11/07/2023 40.4  40.0 - 80.0 % Final    Immature Granulocytes %, Automated 11/07/2023 0.3  0.0 - 0.9 % Final    Immature Granulocyte Count (IG) includes promyelocytes, myelocytes and metamyelocytes but does not include bands. Percent differential counts (%) should be interpreted in the context of the absolute cell counts (cells/UL).    Lymphocytes % 11/07/2023 47.3  13.0 - 44.0 % Final    Monocytes % 11/07/2023 11.2  2.0 - 10.0 % Final    Eosinophils % 11/07/2023 0.6  0.0 - 6.0 % Final    Basophils % 11/07/2023 0.2  0.0 - 2.0 % Final    Neutrophils Absolute 11/07/2023 5.40  1.20 - 7.70 x10*3/uL Final    Percent differential counts (%) should be interpreted in the context of the absolute cell counts (cells/uL).    Immature Granulocytes Absolute, Au* 11/07/2023 0.04  0.00 - 0.70 x10*3/uL Final    Lymphocytes Absolute 11/07/2023 6.31 (H)  1.20 - 4.80 x10*3/uL Final    Monocytes Absolute 11/07/2023 1.49 (H)  0.10 - 1.00 x10*3/uL Final    Eosinophils Absolute 11/07/2023 0.08  0.00 - 0.70 x10*3/uL Final    Basophils Absolute 11/07/2023 0.03  0.00 - 0.10 x10*3/uL Final    Automated WBC differential has been confirmed by manual smear.    Glucose 11/07/2023 451 (HH)  74 - 99 mg/dL Final    Sodium 11/07/2023 134 (L)  136 - 145 mmol/L Final    Potassium  11/07/2023 3.5  3.5 - 5.3 mmol/L Final    Chloride 11/07/2023 94 (L)  98 - 107 mmol/L Final    Bicarbonate 11/07/2023 24  21 - 32 mmol/L Final    Anion Gap 11/07/2023 20  10 - 20 mmol/L Final    Urea Nitrogen 11/07/2023 9  6 - 23 mg/dL Final    Creatinine 11/07/2023 0.55  0.50 - 1.05 mg/dL Final    eGFR 11/07/2023 >90  >60 mL/min/1.73m*2 Final    Calculations of estimated GFR are performed using the 2021 CKD-EPI Study Refit equation without the race variable for the IDMS-Traceable creatinine methods.  https://jasn.asnjournals.org/content/early/2021/09/22/ASN.7815097335    Calcium 11/07/2023 8.2 (L)  8.6 - 10.6 mg/dL Final    Albumin 11/07/2023 3.6  3.4 - 5.0 g/dL Final    Alkaline Phosphatase 11/07/2023 95  33 - 136 U/L Final    Total Protein 11/07/2023 6.9  6.4 - 8.2 g/dL Final    AST 11/07/2023 21  9 - 39 U/L Final    Bilirubin, Total 11/07/2023 0.3  0.0 - 1.2 mg/dL Final    ALT 11/07/2023 19  7 - 45 U/L Final    Patients treated with Sulfasalazine may generate falsely decreased results for ALT.    Magnesium 11/07/2023 1.77  1.60 - 2.40 mg/dL Final    Cancer AG 19-9 11/07/2023 11,705.05 (H)  <35.00 U/mL Final    RBC Morphology 11/07/2023 See Below   Final    Polychromasia 11/07/2023 Mild   Final    Target Cells 11/07/2023 Few   Final    Ovalocytes 11/07/2023 Few   Final    [place] Bodies 11/07/2023 Present   Final     CT chest abdomen pelvis w IV contrast    Result Date: 11/14/2023  Interpreted By:  Andrew Lockwood, STUDY: CT CHEST ABDOMEN PELVIS W IV CONTRAST;  11/13/2023 10:52 am   INDICATION: Signs/Symptoms:pancreas cancer - on chemotherapy - f/u scan, compare to last ct - eval for dz response.   COMPARISON: Multiple CTs of the chest, abdomen and pelvis most recently 08/29/2023 and CT abdomen and pelvis 04/27/2023   ACCESSION NUMBER(S): [id number]   ORDERING CLINICIAN: EDWIN HIGGINS   TECHNIQUE: CT of the chest, abdomen, and pelvis was performed.  Contiguous axial images were obtained at 3 mm slice  thickness through the chest, abdomen and pelvis. Coronal and sagittal reconstructions at 3 mm slice thickness were performed. 70 ml of contrast Omnipaque 300 were administered intravenously without immediate complication.   FINDINGS: CHEST:   LUNG/PLEURA/LARGE AIRWAYS: Central airways are patent without endobronchial lesions. Compared with the most recent CT from 08/29/2020 redemonstration of several pulmonary nodules, stable to mildly increased in size. For example an 8 mm left upper lobe nodule (series 202, image 63), unchanged; 18 mm left lower lobe nodule (image 152), previously 13 mm; a 12 mm left lower lobe nodule (image 130) 11 mm; a 17 mm right lower lobe nodule (image 104), previously 14 mm; 8 mm right lower lobe nodule (image 116) and 8 mm right middle lobe nodule (image 122), unchanged. Multiple other nodules are noted. Upper lobe predominant centrilobular and paraseptal emphysema. No pneumothorax. No pleural effusion.   VESSELS: Aorta and pulmonary arteries are normal caliber.  Moderate atherosclerotic changes are noted of the aorta and branching vessels. Severe coronary artery calcifications are present.   HEART: Normal size.  No pericardial effusion   MEDIASTINUM AND TOVA: No mediastinal, hilar or axillary lymphadenopathy is present.  The esophagus is nondilated and appears normal in entire length.   CHEST WALL AND LOWER NECK: Right-sided port catheter. The visualized thyroid gland appears within normal limits.   ABDOMEN:   LIVER: Hepatomegaly, craniocaudal length 25.5 cm with diffuse steatosis. No significant interval change in the size of a segment VI hypoattenuating infiltrative mass, which measures 7.6 x 8.4 cm, previously 8.0 x 8.4 cm. Unchanged 10 mm segment VII is also unchanged (series 201, image 99). No new lesions identified.   BILE DUCTS: Intrahepatic and extrahepatic bile ducts are prominent which is within normal limits given the status post cholecystectomy.   GALLBLADDER: Status post  cholecystectomy.   PANCREAS: Status post distal pancreatectomy. No suspicious mass or ductal dilation of the remaining pancreatic parenchyma.   SPLEEN: Splenectomy   ADRENAL GLANDS: Unchanged 18 mm left adrenal nodule. Right adrenal gland is unremarkable.   KIDNEYS AND URETERS: The kidneys are normal in size and enhance symmetrically.  No hydroureteronephrosis or nephroureterolithiasis is identified.   PELVIS:   BLADDER: The urinary bladder appears normal without abnormal wall thickening.   REPRODUCTIVE ORGANS: No pelvic masses.   BOWEL: The stomach is unremarkable.  No bowel dilation or wall thickening. Large amount of colonic stool. Normal appendix.     VESSELS: There is no aneurysmal dilatation of the abdominal aorta. The IVC appears normal. Dense atherosclerotic calcification abdominal aorta and iliac arteries.   PERITONEUM/RETROPERITONEUM/LYMPH NODES: There is no free or loculated fluid collection, no free intraperitoneal air. Unchanged lipoma left anterior abdominal mesentery, closely apposed to the rectus abdominus muscle (series 201, image 99).. No abdominopelvic lymphadenopathy is present.   BONE AND SOFT TISSUE: No suspicious osseous lesions are identified. Degenerative discogenic disease is noted in the lower thoracic and lumbar spine.  Stable fat and bowel containing ventral hernia.       CHEST: 1.  Multiple bilateral pulmonary nodules, stable to increased in size since 08/29/2023, concerning for progressive metastatic disease. 2. No suspicious thoracic adenopathy.   ABDOMEN-PELVIS: 1.  Unchanged infiltrative right hepatic lobe hypoattenuating mass, since 08/29/2023, compatible with known metastatic disease. 2. Status post distal pancreatectomy. No suspicious mass or ductal dilation. 3. No suspicious abdominopelvic adenopathy.     Signed by: Andrew Lockwood 11/14/2023 11:21 AM Dictation workstation:   [id number]        PHYSICAL EXAMINATION   Vital signs reviewed      11/16/2023     1:07 PM   Vitals    Systolic 145   Diastolic 82   Heart Rate 102   Temp 35.9 °C (96.6 °F)   Resp 18   Height (in)    Weight (lb) 190.8   BMI 32.38 kg/m2   BSA (m2) 1.98 m2   Visit Report Report        Physical Exam  HENT:      Head: Normocephalic.   Skin:     General: Skin is warm.   Neurological:      Mental Status: She is alert and oriented to person, place, and time.   Psychiatric:         Mood and Affect: Mood normal.         Behavior: Behavior normal.       ASSESSMENT/PLAN    Pain/CIPN  Pain is: cancer related pain  Type: visceral and neuropathic  Pain control: well-controlled  Home regimen:   - Continue oxycodone 20 mg every 4-6 hours as needed for pain. Rx sent.  - Continue 100 mcg Fentanyl patch. Rx sent.  - Continue to use heat  - Continue Lyrica 200 mg TID  - Continue Duloxetine 60 mg at bedtime and 30 mg in the am.   Intolerances/previously tried:   - Morphine ER 15 mg BID caused lightheaded/dizziness   Personalized pain goal: 6/10    Opioid Use  Medication Management:   - OARRS report reviewed with no aberrant behavior; consistent with  prescriptions/records and patient history  - .  Overdose Risk Score 560.   This has been discussed with patient.   - We will continue to closely monitor the patient for signs of prescription misuse including UDS, OARRS review and subjective reports at each visit.  - Concurrent benzodiazepine use   - I am a provider who either is or has consulted and collaborated with a provider certified in Hospice and Palliative Medicine and have conducted a face-face visit and examination for this patient.  - Routine Urine Drug Screen completed 11/16/23 results pending  - Controlled Substance Agreement Completed 11/16/23. Repeat yearly   - Specifically discussed that controlled substance prescriptions will only be provided by our group as outlined in the completed agreement  - Prescribed naloxone: discuss next visit   - Red Flags: None     Nausea   Intermittent nausea without vomiting related to  chemotherapy   - Continue Ondansetron 8 mg every 8 hours as needed for nausea  - Continue olanzapine 10 mg at bedtime   - She has been taking Prochlorperazine on her chemo weeks, but not taking it after that    Constipation  At risk for constipation related to opioids,  currently not constipated  - Continue imodium 2 mg with meals - instructed her to take 4 mg 30 minutes before her first meal and then 2 mg 30 minutes before each subsequent meal - not to exceed 16 mg/day  - Continue Lomotil as needed for diarrhea   - Continue activia for constipation  - Start Senokot 2 tabs BID  - Start Miralax daily  - Start MOM 8% if no BM within 48-72 hours     Altered Mood  Chronic anxiety related to health concerns   controlled with home regimen  Home regimen:   - Fluoxetine was discontinued by her PCP so she could start Duloxetine  - Continue Xanax 1 mg BID  - Continue olanzapine as described above this may help with mood, sleep, and nausea  - Continue Duloxetine 30mg in the morning and 60mg at bedtime    Sleeping Difficulty:  Impaired sleep related to anxiety and disease  Home regimen:    - Continue Olanzapine as described above  - Continue 1 mg of Xanax BID  - Has tried trazodone in the past - was not effective   - Continue Ambien to 10 mg at bedtime      Supportive and Palliative Oncology encounter:  Spoke with patient and son at bedside  Emotional support provided  Coordination of care  We will continue to follow and address symptoms as needed    Medical Decision Making/Goals of Care/Advance Care Planning:  Patient's current clinical condition, including diagnosis, prognosis, and management plan, and goals of care were discussed.   Life limiting disease: metastatic malignancy  Family: Supportive   Performance status: Major limitations due to pain, fatigue, and disease process  Joys/meaning/strength: dog and TV  Understanding of health: Demonstrates good prognostic understanding of disease process, understands plan for  ongoing treatment. Repeat CT scan in November  Information:Wants full disclosure  Goals: symptom control and cancer directed therapy  Worries and fears now and future: ongoing symptoms and inability to receive cancer treatment   Code status discussion:  Full code    Advance Directives  Existence of Advance Directives:No - not interested  Decision maker: Surrogate decision maker is Reji England.   Code Status: Full code    Next Follow-Up Visit:  Return to clinic in 2 months. Will try to align with Dr. Blum    Signature and billing  Medical complexity was low level due to due to complexity of problems, extensive data review, and high risk of management/treatment.  Time was spent on the following: Prep Time, Time Directly with Patient/Family/Caregiver, Documentation Time. Total time spent: 25      Data  Diagnostic tests and information reviewed for today's visit:  Most recent labs, Most recent imaging, Medications         Some elements copied from Randi Parks note on 10/5/23, the elements have been updated and all reflect current decision making from today, 11/16/2023.      Plan of Care discussed with: Patient and Family/Significant Other:     SIGNATURE: GIUSEPPE Lopez-CNP    Contact information:  Supportive and Palliative Oncology  Monday-Friday 8 AM-5 PM  Phone:  [phone], press option #5, then option #1.   Or Epic Secure Chat

## 2023-11-16 NOTE — PROGRESS NOTES
Consent:  Verbal consent was requested and obtained from patient on this date for a telehealth visit.    Patient Visit Information:   Visit Type: Follow Up Visit      Cancer History:   Treatment Synopsis:    [facility]- started care at [facility], with Dr. Main.  Transferred to Dr. Blum     HEMATOLOGY & ONCOLOGY PROBLEMS:  1. Stage I pancreatic cancer.         a.  S/p distal pancreatectomy by Dr. Phelps.          b.  Adjuvant chemotherapy with Folfirinox from 2018 to March 2019.          c.  Recurrence 9/2021 with metastatic disease           Treated with : FOLFIRINOX, C1 = 10/26/2021 - continued thru Jan 2023 when she had progression         Feb 2023 - started QOW gem / abraxane          Sep 2023  - started QOW gem/ cis    ** Copied from [address] [place] [address] on 31-Aug-2023 09:32AM by lauren **     Test Name: [place] xT (XT.V4)  Laboratory Name: [facility]  Specimen Source: Pancreas  Collection Date: 27-Aug-2018  Cancer Type: Pancreatic anaplastic carcinoma  Report Id: [id number]     Molecular Findings:  * Gene: KRAS, Variant: Missense variant (exon 2) - GOF, Biologically Relevant, p.G12D (Allele Frequency - 12.8%)  * Gene: TP53, Variant: Splice region variant - LOF, Biologically Relevant, p.T125T, Splice Site (Allele Frequency - 8.8%)  * Gene: CKS1B, Variant: Amplification, Copy number gain, Biologically Relevant  * Biomarker: Microsatellite Instability, Status: stable  * Biomarker: Tumor Mutation [place] (1.6 Muts/Mb, Percentile: 12.0)  * 3 variants of unknown significance     ** End of copied information **   HPI    - telephone visit completed with Chayito prior to next round of chemotherapy  - tomorrow will be 3rd dose of [place]/cis.    C/o fatigue / intermittent nausea   + mucositis - taking in magic mouth rinse prn  All other ROS reviewed & are negative      Physical Exam - deferred,  telehealth visit     Infusion on 11/07/2023   Component Date Value    WBC 11/07/2023 13.4 (H)     nRBC  11/07/2023      RBC 11/07/2023 4.42     Hemoglobin 11/07/2023 10.9 (L)     Hematocrit 11/07/2023 36.5     MCV 11/07/2023 83     MCH 11/07/2023 24.7 (L)     MCHC 11/07/2023 29.9 (L)     RDW 11/07/2023 24.4 (H)     Platelets 11/07/2023 265     Neutrophils % 11/07/2023 40.4     Immature Granulocytes %,* 11/07/2023 0.3     Lymphocytes % 11/07/2023 47.3     Monocytes % 11/07/2023 11.2     Eosinophils % 11/07/2023 0.6     Basophils % 11/07/2023 0.2     Neutrophils Absolute 11/07/2023 5.40     Immature Granulocytes Ab* 11/07/2023 0.04     Lymphocytes Absolute 11/07/2023 6.31 (H)     Monocytes Absolute 11/07/2023 1.49 (H)     Eosinophils Absolute 11/07/2023 0.08     Basophils Absolute 11/07/2023 0.03     Glucose 11/07/2023 451 (HH)     Sodium 11/07/2023 134 (L)     Potassium 11/07/2023 3.5     Chloride 11/07/2023 94 (L)     Bicarbonate 11/07/2023 24     Anion Gap 11/07/2023 20     Urea Nitrogen 11/07/2023 9     Creatinine 11/07/2023 0.55     eGFR 11/07/2023 >90     Calcium 11/07/2023 8.2 (L)     Albumin 11/07/2023 3.6     Alkaline Phosphatase 11/07/2023 95     Total Protein 11/07/2023 6.9     AST 11/07/2023 21     Bilirubin, Total 11/07/2023 0.3     ALT 11/07/2023 19     Magnesium 11/07/2023 1.77     Cancer AG 19-9 11/07/2023 11,705.05 (H)     RBC Morphology 11/07/2023 See Below     Polychromasia 11/07/2023 Mild     Target Cells 11/07/2023 Few     Ovalocytes 11/07/2023 Few     [place] Bodies 11/07/2023 Present       === 11/13/23 ===    CT CHEST ABDOMEN PELVIS W IV CONTRAST    - Impression -  CHEST:  1.  Multiple bilateral pulmonary nodules, stable to increased in size  since 08/29/2023, concerning for progressive metastatic disease.  2. No suspicious thoracic adenopathy.    ABDOMEN-PELVIS:  1.  Unchanged infiltrative right hepatic lobe hypoattenuating mass,  since 08/29/2023, compatible with known metastatic disease.  2. Status post distal pancreatectomy. No suspicious mass or ductal  dilation.  3. No suspicious  abdominopelvic adenopathy.      Signed by: Andrew Lockwood 11/14/2023 11:21 AM  Dictation workstation:   [id number]      Performance Status:  Symptomatic; in bed <50% of the day    Assessment/Plan    Oncology History   Malignant neoplasm of pancreas (CMS/HCC)   9/14/2023 -  Chemotherapy    Gemcitabine / CISplatin, 28 Day Cycles - Pancreatic Adenocarcinoma     9/25/2023 Initial Diagnosis    Malignant neoplasm of pancreas (CMS/HCC)            Assessment and Plan:   Assessment:    63 y.o.  woman with Stage Ia pancreatic cancer s/p DPS with Dr. Phelps in 2018 followed by adjuvant FOLFIRINOX until 3/2019.  She has a significant family history  with her mother, father, grandmother, sister, and two aunts - all dying from pancreatic cancer. Family carries BRIP1 c.508-1G>C. As per 's review, mutations in the BRIP1 gene are associated with a 10% lifetime risk of ovarian cancer and an elevated  risk of breast cancer (exact quantification of risk not available). Her  level was found to be markedly elevated at 1614 in late 2021. Follow-up CT scan showed an isolated liver lesion and enlarged peripancreatic LN.  CT-guided biopsy of liver done  at [facility] was positive for recurrent adenocarcinoma.   She was removed from CD40+ Budigalimab + FOLFIRINOX 2/2 kidney injury -- received 2 cycles FOLFIRINOX resumed 01/2022 -- dose reductions for oxaliplatin, infusional 5FU, irinotecan, 5FU bolus omitted.  Oxaliplatin dropped in July 2022 due to worsening  neuropathy.  Has continued on Q 2 week FOLFIRI with decreasing  levels to 300's.  Had treatment break in september due to vacation.  Restarted on 10/5.  Tolerated well.  Treatment held x 1 dose in 12/2022 for mild COVID +, with complete recovery  and treatment resumed in 1/2023. She had prorgression in 2/2023 in liver and lungs.   - Newly diagnosed PE April 2023     # Resected Pancreatic cancer with subsequent liver metastasis: liver primary   disease-  - Switched  to gemcitabine/ nab-paclitaxel every 2 weeks at [facility] in March 2023  - 4/29/23 - s/p 2 cycles - CT scans wtih slight increase in 1 liver lesion, reviewed with Dr. Blum - overall stable dz   - 5/3/23 - C3D1    - 5/17/23 - C3D15 held due to fall    - head CT negative in ED    - 5/23 - 5/29/23 admitted to [facility] with SBO / resolved after NG decompression     Progression of disease noted on CT C/A/P with enlarging liver lesion.   Plan to start [place]/cis on 09/13/23    - now s/p 2 doses -       3rd dose - 10/25      4th dose - 11/8  RTC to see Dr. Blum 11/16 with scans prior to visit     Scans look worse 11/2023 with rising Ca19-9 indicating progressive disease.    Will have on phase I trial list.    Obtain [facility] for other targeted options.  - Look for BRIP1 trials.        #PE    - continue anticoagulation lifelong  - as needed oxygen      #mucositis    - continue baking soda rinses    - try magic mouthwash      # Diarrhea:    - improved on enzymes / lomotil.      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 Saunders in gyn/onc  - Last mammogram 3/23     Terrance Blum MD

## 2023-11-17 LAB
AMPHETAMINES UR QL SCN: ABNORMAL
BARBITURATES UR QL SCN: ABNORMAL
BENZODIAZ UR QL SCN: ABNORMAL
BZE UR QL SCN: ABNORMAL
CANNABINOIDS UR QL SCN: ABNORMAL
FENTANYL+NORFENTANYL UR QL SCN: ABNORMAL
OPIATES UR QL SCN: ABNORMAL
OXYCODONE+OXYMORPHONE UR QL SCN: ABNORMAL
PCP UR QL SCN: ABNORMAL

## 2023-11-24 LAB
FENTANYL UR CFM-MCNC: 5 NG/ML
NORFENTANYL UR CFM-MCNC: 44.1 NG/ML

## 2023-11-28 LAB
6MAM UR CFM-MCNC: <25 NG/ML
CODEINE UR CFM-MCNC: <50 NG/ML
HYDROCODONE CTO UR CFM-MCNC: <25 NG/ML
HYDROMORPHONE UR CFM-MCNC: <25 NG/ML
MORPHINE UR CFM-MCNC: <50 NG/ML
NORHYDROCODONE UR CFM-MCNC: <25 NG/ML
NOROXYCODONE UR CFM-MCNC: 265 NG/ML
OXYCODONE UR CFM-MCNC: 212 NG/ML
OXYMORPHONE UR CFM-MCNC: 141 NG/ML

## 2023-11-29 DIAGNOSIS — R11.0 NAUSEA: ICD-10-CM

## 2023-11-29 RX ORDER — ONDANSETRON 8 MG/1
8 TABLET, ORALLY DISINTEGRATING ORAL EVERY 8 HOURS PRN
Qty: 90 TABLET | Refills: 1 | Status: SHIPPED | OUTPATIENT
Start: 2023-11-29 | End: 2023-12-01

## 2023-11-29 NOTE — TELEPHONE ENCOUNTER
Pt requesting a refill of duloxetine 30mg daily.  She was told by her pharmacy that they don't have a current prescription.  [facility] in [place].

## 2023-11-29 NOTE — TELEPHONE ENCOUNTER
Patient last seen by GIUSEPPE Parks on 11/16 with plan to continue zofran. Prescription pended to provider.

## 2023-12-01 ENCOUNTER — TELEPHONE (OUTPATIENT)
Dept: HEMATOLOGY/ONCOLOGY | Facility: HOSPITAL | Age: 63
End: 2023-12-01
Payer: COMMERCIAL

## 2023-12-01 DIAGNOSIS — R11.2 NAUSEA AND VOMITING, UNSPECIFIED VOMITING TYPE: Primary | ICD-10-CM

## 2023-12-01 RX ORDER — ONDANSETRON HYDROCHLORIDE 8 MG/1
8 TABLET, FILM COATED ORAL 3 TIMES DAILY PRN
Qty: 90 TABLET | Refills: 1 | Status: SHIPPED | OUTPATIENT
Start: 2023-12-01

## 2023-12-01 NOTE — TELEPHONE ENCOUNTER
Chayito does not get same relief with under the tongue Zofran and the regular PO tablets. New refills sent to [facility] in tablet form.

## 2023-12-14 ENCOUNTER — SOCIAL WORK (OUTPATIENT)
Dept: CASE MANAGEMENT | Facility: HOSPITAL | Age: 63
End: 2023-12-14

## 2023-12-14 ENCOUNTER — OFFICE VISIT (OUTPATIENT)
Dept: HEMATOLOGY/ONCOLOGY | Facility: HOSPITAL | Age: 63
End: 2023-12-14
Payer: COMMERCIAL

## 2023-12-14 ENCOUNTER — LAB (OUTPATIENT)
Dept: HEMATOLOGY/ONCOLOGY | Facility: HOSPITAL | Age: 63
End: 2023-12-14
Payer: COMMERCIAL

## 2023-12-14 VITALS
OXYGEN SATURATION: 99 % | SYSTOLIC BLOOD PRESSURE: 94 MMHG | WEIGHT: 192.3 LBS | BODY MASS INDEX: 32.63 KG/M2 | RESPIRATION RATE: 20 BRPM | TEMPERATURE: 96.1 F | HEART RATE: 115 BPM | DIASTOLIC BLOOD PRESSURE: 54 MMHG

## 2023-12-14 DIAGNOSIS — K76.82 HEPATIC ENCEPHALOPATHY (MULTI): Primary | ICD-10-CM

## 2023-12-14 DIAGNOSIS — C25.0 MALIGNANT NEOPLASM OF HEAD OF PANCREAS (MULTI): ICD-10-CM

## 2023-12-14 DIAGNOSIS — C25.9 MALIGNANT NEOPLASM OF PANCREAS, UNSPECIFIED LOCATION OF MALIGNANCY (MULTI): ICD-10-CM

## 2023-12-14 LAB
ALBUMIN SERPL BCP-MCNC: 2.4 G/DL (ref 3.4–5)
ALP SERPL-CCNC: 255 U/L (ref 33–136)
ALT SERPL W P-5'-P-CCNC: 98 U/L (ref 7–45)
ANION GAP SERPL CALC-SCNC: 29 MMOL/L (ref 10–20)
AST SERPL W P-5'-P-CCNC: 253 U/L (ref 9–39)
BASOPHILS # BLD MANUAL: 0 X10*3/UL (ref 0–0.1)
BASOPHILS NFR BLD MANUAL: 0 %
BILIRUB SERPL-MCNC: 1.7 MG/DL (ref 0–1.2)
BUN SERPL-MCNC: 16 MG/DL (ref 6–23)
BURR CELLS BLD QL SMEAR: ABNORMAL
CALCIUM SERPL-MCNC: 7.5 MG/DL (ref 8.6–10.3)
CANCER AG19-9 SERPL-ACNC: 3724.77 U/ML
CHLORIDE SERPL-SCNC: 89 MMOL/L (ref 98–107)
CO2 SERPL-SCNC: 20 MMOL/L (ref 21–32)
CREAT SERPL-MCNC: 1.03 MG/DL (ref 0.5–1.05)
DACRYOCYTES BLD QL SMEAR: ABNORMAL
EOSINOPHIL # BLD MANUAL: 0 X10*3/UL (ref 0–0.7)
EOSINOPHIL NFR BLD MANUAL: 0 %
ERYTHROCYTE [DISTWIDTH] IN BLOOD BY AUTOMATED COUNT: 26.5 % (ref 11.5–14.5)
GFR SERPL CREATININE-BSD FRML MDRD: 62 ML/MIN/1.73M*2
GLUCOSE SERPL-MCNC: 131 MG/DL (ref 74–99)
HCT VFR BLD AUTO: 36.3 % (ref 36–46)
HGB BLD-MCNC: 11.3 G/DL (ref 12–16)
HOWELL-JOLLY BOD BLD QL SMEAR: PRESENT
IMM GRANULOCYTES # BLD AUTO: 0.24 X10*3/UL (ref 0–0.7)
IMM GRANULOCYTES NFR BLD AUTO: 0.8 % (ref 0–0.9)
LYMPHOCYTES # BLD MANUAL: 0.87 X10*3/UL (ref 1.2–4.8)
LYMPHOCYTES NFR BLD MANUAL: 3 %
MCH RBC QN AUTO: 26.2 PG (ref 26–34)
MCHC RBC AUTO-ENTMCNC: 31.1 G/DL (ref 32–36)
MCV RBC AUTO: 84 FL (ref 80–100)
MONOCYTES # BLD MANUAL: 1.73 X10*3/UL (ref 0.1–1)
MONOCYTES NFR BLD MANUAL: 6 %
NEUTROPHILS # BLD MANUAL: 26.3 X10*3/UL (ref 1.2–7.7)
NEUTS BAND # BLD MANUAL: 1.73 X10*3/UL (ref 0–0.7)
NEUTS BAND NFR BLD MANUAL: 6 %
NEUTS SEG # BLD MANUAL: 24.57 X10*3/UL (ref 1.2–7)
NEUTS SEG NFR BLD MANUAL: 85 %
NRBC BLD-RTO: 0.1 /100 WBCS (ref 0–0)
PLATELET # BLD AUTO: 142 X10*3/UL (ref 150–450)
POTASSIUM SERPL-SCNC: 3.3 MMOL/L (ref 3.5–5.3)
PROT SERPL-MCNC: 6.6 G/DL (ref 6.4–8.2)
RBC # BLD AUTO: 4.31 X10*6/UL (ref 4–5.2)
RBC MORPH BLD: ABNORMAL
SODIUM SERPL-SCNC: 135 MMOL/L (ref 136–145)
TARGETS BLD QL SMEAR: ABNORMAL
TOTAL CELLS COUNTED BLD: 100
WBC # BLD AUTO: 28.9 X10*3/UL (ref 4.4–11.3)

## 2023-12-14 PROCEDURE — 80053 COMPREHEN METABOLIC PANEL: CPT

## 2023-12-14 PROCEDURE — 3078F DIAST BP <80 MM HG: CPT | Performed by: INTERNAL MEDICINE

## 2023-12-14 PROCEDURE — 99214 OFFICE O/P EST MOD 30 MIN: CPT | Performed by: INTERNAL MEDICINE

## 2023-12-14 PROCEDURE — 3046F HEMOGLOBIN A1C LEVEL >9.0%: CPT | Performed by: INTERNAL MEDICINE

## 2023-12-14 PROCEDURE — 85007 BL SMEAR W/DIFF WBC COUNT: CPT

## 2023-12-14 PROCEDURE — 36591 DRAW BLOOD OFF VENOUS DEVICE: CPT

## 2023-12-14 PROCEDURE — 86301 IMMUNOASSAY TUMOR CA 19-9: CPT

## 2023-12-14 PROCEDURE — 85027 COMPLETE CBC AUTOMATED: CPT

## 2023-12-14 PROCEDURE — 4010F ACE/ARB THERAPY RXD/TAKEN: CPT | Performed by: INTERNAL MEDICINE

## 2023-12-14 PROCEDURE — 3074F SYST BP LT 130 MM HG: CPT | Performed by: INTERNAL MEDICINE

## 2023-12-14 PROCEDURE — 2500000004 HC RX 250 GENERAL PHARMACY W/ HCPCS (ALT 636 FOR OP/ED): Performed by: INTERNAL MEDICINE

## 2023-12-14 PROCEDURE — 96523 IRRIG DRUG DELIVERY DEVICE: CPT

## 2023-12-14 RX ORDER — LACTULOSE 10 G/15ML
10 SOLUTION ORAL DAILY
Qty: 960 ML | Refills: 5 | Status: SHIPPED | OUTPATIENT
Start: 2023-12-14 | End: 2023-12-15 | Stop reason: SDUPTHER

## 2023-12-14 RX ORDER — HEPARIN 100 UNIT/ML
500 SYRINGE INTRAVENOUS AS NEEDED
Status: DISCONTINUED | OUTPATIENT
Start: 2023-12-14 | End: 2023-12-14 | Stop reason: HOSPADM

## 2023-12-14 RX ORDER — HEPARIN SODIUM,PORCINE/PF 10 UNIT/ML
50 SYRINGE (ML) INTRAVENOUS AS NEEDED
OUTPATIENT
Start: 2023-12-14

## 2023-12-14 RX ORDER — HEPARIN 100 UNIT/ML
500 SYRINGE INTRAVENOUS AS NEEDED
OUTPATIENT
Start: 2023-12-14

## 2023-12-14 RX ADMIN — HEPARIN 500 UNITS: 100 SYRINGE at 11:26

## 2023-12-14 ASSESSMENT — PAIN SCALES - GENERAL: PAINLEVEL: 9

## 2023-12-14 NOTE — PROGRESS NOTES
MADHURI was consulted by ANA Hernandez, RN Partner with Dr. Blum. Patient is a 63 year old female with dx pancreatic cancer. MADHURI met with patient and son at [facility] with Dr. Blum. Dr. Blum is recommending Hospice. MADHURI spoke to patient and son about hospice. Patient aware of what hospice is and accepting of plan. Patient agreeable to [facility]. MADHURI provided literature to son and made referral today. TIAN [phone]. MADHURI faxed information to [phone].    Update: MADHURI received returned call from TIAN. Patient and son have a meeting with TIAN on 12/18/23 between 9:30am-10am with MADHURI Magana.

## 2023-12-14 NOTE — PROGRESS NOTES
Consent:  Verbal consent was requested and obtained from patient on this date for a telehealth visit.    Patient Visit Information:   Visit Type: Follow Up Visit      Cancer History:   Treatment Synopsis:    [facility]- started care at [facility], with Dr. Main.  Transferred to Dr. Blum     HEMATOLOGY & ONCOLOGY PROBLEMS:  1. Stage I pancreatic cancer.         a.  S/p distal pancreatectomy by Dr. Phelps.          b.  Adjuvant chemotherapy with Folfirinox from 2018 to March 2019.          c.  Recurrence 9/2021 with metastatic disease           Treated with : FOLFIRINOX, C1 = 10/26/2021 - continued thru Jan 2023 when she had progression         Feb 2023 - started QOW gem / abraxane          Sep 2023  - started QOW gem/ cis    ** Copied from [address] [place] [address] on 31-Aug-2023 09:32AM by lauren **     Test Name: [place] xT (XT.V4)  Laboratory Name: [facility]  Specimen Source: Pancreas  Collection Date: 27-Aug-2018  Cancer Type: Pancreatic anaplastic carcinoma  Report Id: [id number]     Molecular Findings:  * Gene: KRAS, Variant: Missense variant (exon 2) - GOF, Biologically Relevant, p.G12D (Allele Frequency - 12.8%)  * Gene: TP53, Variant: Splice region variant - LOF, Biologically Relevant, p.T125T, Splice Site (Allele Frequency - 8.8%)  * Gene: CKS1B, Variant: Amplification, Copy number gain, Biologically Relevant  * Biomarker: Microsatellite Instability, Status: stable  * Biomarker: Tumor Mutation [place] (1.6 Muts/Mb, Percentile: 12.0)  * 3 variants of unknown significance     ** End of copied information **   HPI    - telephone visit completed with Ch[place] prior to next round of chemotherapy  - tomorrow will be 3rd dose of [place]/cis.    C/o fatigue / intermittent nausea   + mucositis - taking in magic mouth rinse prn  All other ROS reviewed & are negative      Physical Exam - deferred,  telehealth visit     Lab on 12/14/2023   Component Date Value    Glucose 12/14/2023 131 (H)     Sodium  12/14/2023 135 (L)     Potassium 12/14/2023 3.3 (L)     Chloride 12/14/2023 89 (L)     Bicarbonate 12/14/2023 20 (L)     Anion Gap 12/14/2023 29 (H)     Urea Nitrogen 12/14/2023 16     Creatinine 12/14/2023 1.03     eGFR 12/14/2023 62     Calcium 12/14/2023 7.5 (L)     Albumin 12/14/2023 2.4 (L)     Alkaline Phosphatase 12/14/2023 255 (H)     Total Protein 12/14/2023 6.6     AST 12/14/2023 253 (H)     Bilirubin, Total 12/14/2023 1.7 (H)     ALT 12/14/2023 98 (H)     WBC 12/14/2023 28.9 (H)     nRBC 12/14/2023 0.1 (H)     RBC 12/14/2023 4.31     Hemoglobin 12/14/2023 11.3 (L)     Hematocrit 12/14/2023 36.3     MCV 12/14/2023 84     MCH 12/14/2023 26.2     MCHC 12/14/2023 31.1 (L)     RDW 12/14/2023 26.5 (H)     Platelets 12/14/2023 142 (L)     Immature Granulocytes %,* 12/14/2023 0.8     Immature Granulocytes Ab* 12/14/2023 0.24     Neutrophils %, Manual 12/14/2023 85.0     Bands %, Manual 12/14/2023 6.0     Lymphocytes %, Manual 12/14/2023 3.0     Monocytes %, Manual 12/14/2023 6.0     Eosinophils %, Manual 12/14/2023 0.0     Basophils %, Manual 12/14/2023 0.0     Seg Neutrophils Absolute* 12/14/2023 24.57 (H)     Bands Absolute, Manual 12/14/2023 1.73 (H)     Lymphocytes Absolute, Ma* 12/14/2023 0.87 (L)     Monocytes Absolute, Manu* 12/14/2023 1.73 (H)     Eosinophils Absolute, Ma* 12/14/2023 0.00     Basophils Absolute, Manu* 12/14/2023 0.00     Total Cells Counted 12/14/2023 100     Neutrophils Absolute, Ma* 12/14/2023 26.30 (H)     RBC Morphology 12/14/2023 See Below     Target Cells 12/14/2023 Few     Teardrop Cells 12/14/2023 Few     [place] Cells 12/14/2023 Many     Quinn-Jolly Bodies 12/14/2023 Present       === 11/13/23 ===    CT CHEST ABDOMEN PELVIS W IV CONTRAST    - Impression -  CHEST:  1.  Multiple bilateral pulmonary nodules, stable to increased in size  since 08/29/2023, concerning for progressive metastatic disease.  2. No suspicious thoracic adenopathy.    ABDOMEN-PELVIS:  1.  Unchanged infiltrative  right hepatic lobe hypoattenuating mass,  since 08/29/2023, compatible with known metastatic disease.  2. Status post distal pancreatectomy. No suspicious mass or ductal  dilation.  3. No suspicious abdominopelvic adenopathy.      Signed by: Andrew Lockwood 11/14/2023 11:21 AM  Dictation workstation:   [id number]      Performance Status:  Symptomatic; in bed <50% of the day    Assessment/Plan    Oncology History   Malignant neoplasm of pancreas (CMS/HCC)   9/14/2023 -  Chemotherapy    Gemcitabine / CISplatin, 28 Day Cycles - Pancreatic Adenocarcinoma     9/25/2023 Initial Diagnosis    Malignant neoplasm of pancreas (CMS/HCC)            Assessment and Plan:   Assessment:    63 y.o.  woman with Stage Ia pancreatic cancer s/p DPS with Dr. Phelps in 2018 followed by adjuvant FOLFIRINOX until 3/2019.  She has a significant family history  with her mother, father, grandmother, sister, and two aunts - all dying from pancreatic cancer. Family carries BRIP1 c.508-1G>C. As per 's review, mutations in the BRIP1 gene are associated with a 10% lifetime risk of ovarian cancer and an elevated  risk of breast cancer (exact quantification of risk not available). Her  level was found to be markedly elevated at 1614 in late 2021. Follow-up CT scan showed an isolated liver lesion and enlarged peripancreatic LN.  CT-guided biopsy of liver done  at [facility] was positive for recurrent adenocarcinoma.   She was removed from CD40+ Budigalimab + FOLFIRINOX 2/2 kidney injury -- received 2 cycles FOLFIRINOX resumed 01/2022 -- dose reductions for oxaliplatin, infusional 5FU, irinotecan, 5FU bolus omitted.  Oxaliplatin dropped in July 2022 due to worsening  neuropathy.  Has continued on Q 2 week FOLFIRI with decreasing  levels to 300's.  Had treatment break in september due to vacation.  Restarted on 10/5.  Tolerated well.  Treatment held x 1 dose in 12/2022 for mild COVID +, with complete recovery   and treatment resumed in 1/2023. She had prorgression in 2/2023 in liver and lungs.   - Newly diagnosed PE April 2023     # Resected Pancreatic cancer with subsequent liver metastasis: liver primary  disease-  - Switched  to gemcitabine/ nab-paclitaxel every 2 weeks at [facility] in March 2023  - 4/29/23 - s/p 2 cycles - CT scans wtih slight increase in 1 liver lesion, reviewed with Dr. Blum - overall stable dz   - 5/3/23 - C3D1    - 5/17/23 - C3D15 held due to fall    - head CT negative in ED    - 5/23 - 5/29/23 admitted to [facility] with SBO / resolved after NG decompression     Progression of disease noted on CT C/A/P with enlarging liver lesion.   Plan to start [place]/cis on 09/13/23    - now s/p 2 doses -       3rd dose - 10/25      4th dose - 11/8/2023  Scans look worse 11/2023 with rising Ca19-9 indicating progressive disease.    12/2023 pt presented for f/up  off treatment but is exhibiting signs of liver failure.     #Liver failure  - metastatic ds  - needs para  - needs lactulose  - Hospice referral.        #PE    - continue anticoagulation lifelong  - as needed oxygen      #mucositis    - continue baking soda rinses    - try magic mouthwash      # Diarrhea:    - improved on enzymes / lomotil.      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 Saunders in gyn/onc  - Last mammogram 3/23     Terrance Blum MD

## 2023-12-15 ENCOUNTER — NURSE TRIAGE (OUTPATIENT)
Dept: HEMATOLOGY/ONCOLOGY | Facility: HOSPITAL | Age: 63
End: 2023-12-15
Payer: COMMERCIAL

## 2023-12-15 DIAGNOSIS — G89.3 CANCER RELATED PAIN: Primary | ICD-10-CM

## 2023-12-15 DIAGNOSIS — K76.82 HEPATIC ENCEPHALOPATHY (MULTI): ICD-10-CM

## 2023-12-15 DIAGNOSIS — R11.2 NAUSEA AND VOMITING, UNSPECIFIED VOMITING TYPE: ICD-10-CM

## 2023-12-15 RX ORDER — NALOXONE HYDROCHLORIDE 4 MG/.1ML
4 SPRAY NASAL AS NEEDED
Qty: 2 EACH | Refills: 0 | Status: SHIPPED | OUTPATIENT
Start: 2023-12-15 | End: 2024-12-14

## 2023-12-15 RX ORDER — OLANZAPINE 10 MG/1
10 TABLET ORAL NIGHTLY
Qty: 30 TABLET | Refills: 2 | Status: SHIPPED | OUTPATIENT
Start: 2023-12-15

## 2023-12-15 RX ORDER — OXYCODONE HYDROCHLORIDE 20 MG/1
20 TABLET ORAL EVERY 4 HOURS PRN
Qty: 180 TABLET | Refills: 0 | Status: SHIPPED | OUTPATIENT
Start: 2023-12-15 | End: 2024-01-14

## 2023-12-15 RX ORDER — LACTULOSE 10 G/15ML
10 SOLUTION ORAL DAILY
Qty: 960 ML | Refills: 5 | Status: SHIPPED | OUTPATIENT
Start: 2023-12-15

## 2023-12-15 RX ORDER — FENTANYL 100 UG/H
1 PATCH TRANSDERMAL
Qty: 10 PATCH | Refills: 0 | Status: SHIPPED | OUTPATIENT
Start: 2023-12-15

## 2023-12-15 NOTE — TELEPHONE ENCOUNTER
Patients son made aware that she can double her oxy dose if needed if she is in pain per MD. Urged to get hospice to evaluate sooner if possible - he states that patient has an appt on Monday.

## 2023-12-15 NOTE — TELEPHONE ENCOUNTER
Patient's son calls to state that they met with Dr. Blum yesterday and medications were not sent to pharmacy as discussed.  Preferred pharmacy is [email or web address] in [place].  Message sent to team.

## 2023-12-15 NOTE — TELEPHONE ENCOUNTER
Pt's son calling back, States pt is having 10/10 pain all over. Only took 1/2 oxycodone 20mg tablet yesterday, as she thought Dr. Blum team was going to call in new pain medication because oxycodone was not helping. Pt encouraged to take oxycodone now to help with pain - pt has 3 pills left.   Pt also did not receive anti nausea meds. Pt has been dry heaving overnight and today.   Pt did receive lactulose, but has not started, remains constipated.   Pt was seeing sup onc team but per son, Dr. Blum advised he would manage medications until pt began hospice services.   Pt will be meeting with hospice on Monday.   Son is requesting medications be sent to pharmacy asap.

## 2023-12-15 NOTE — TELEPHONE ENCOUNTER
Dr. Blum advised that pain meds and anti nausea meds sent to pharmacy.   Supportive listening and education provided to son.   Son is going to try new pain medication regimen- if no relief will call hospice for admission to services over weekend.